# Patient Record
Sex: MALE | Race: WHITE | Employment: FULL TIME | URBAN - METROPOLITAN AREA
[De-identification: names, ages, dates, MRNs, and addresses within clinical notes are randomized per-mention and may not be internally consistent; named-entity substitution may affect disease eponyms.]

---

## 2017-04-01 ENCOUNTER — APPOINTMENT (OUTPATIENT)
Dept: GENERAL RADIOLOGY | Age: 52
End: 2017-04-01
Attending: EMERGENCY MEDICINE
Payer: COMMERCIAL

## 2017-04-01 ENCOUNTER — HOSPITAL ENCOUNTER (EMERGENCY)
Age: 52
Discharge: HOME | End: 2017-04-01
Attending: EMERGENCY MEDICINE
Payer: COMMERCIAL

## 2017-04-01 VITALS
SYSTOLIC BLOOD PRESSURE: 102 MMHG | TEMPERATURE: 98 F | RESPIRATION RATE: 18 BRPM | BODY MASS INDEX: 31.25 KG/M2 | OXYGEN SATURATION: 95 % | HEIGHT: 69 IN | WEIGHT: 211 LBS | HEART RATE: 92 BPM | DIASTOLIC BLOOD PRESSURE: 62 MMHG

## 2017-04-01 DIAGNOSIS — J18.9 PNEUMONIA OF RIGHT MIDDLE LOBE DUE TO INFECTIOUS ORGANISM: Primary | ICD-10-CM

## 2017-04-01 PROCEDURE — 99284 EMERGENCY DEPT VISIT MOD MDM: CPT

## 2017-04-01 PROCEDURE — 36415 COLL VENOUS BLD VENIPUNCTURE: CPT

## 2017-04-01 PROCEDURE — 80047 BASIC METABLC PNL IONIZED CA: CPT

## 2017-04-01 PROCEDURE — 80048 BASIC METABOLIC PNL TOTAL CA: CPT | Performed by: EMERGENCY MEDICINE

## 2017-04-01 PROCEDURE — 71020 XR CHEST PA + LAT CHEST (CPT=71020): CPT

## 2017-04-01 PROCEDURE — 85025 COMPLETE CBC W/AUTO DIFF WBC: CPT | Performed by: EMERGENCY MEDICINE

## 2017-04-01 PROCEDURE — 94640 AIRWAY INHALATION TREATMENT: CPT

## 2017-04-01 PROCEDURE — 94664 DEMO&/EVAL PT USE INHALER: CPT

## 2017-04-01 PROCEDURE — 96374 THER/PROPH/DIAG INJ IV PUSH: CPT

## 2017-04-01 RX ORDER — METHYLPREDNISOLONE SODIUM SUCCINATE 125 MG/2ML
125 INJECTION, POWDER, LYOPHILIZED, FOR SOLUTION INTRAMUSCULAR; INTRAVENOUS ONCE
Status: COMPLETED | OUTPATIENT
Start: 2017-04-01 | End: 2017-04-01

## 2017-04-01 RX ORDER — AZITHROMYCIN 250 MG/1
TABLET, FILM COATED ORAL
Qty: 1 PACKAGE | Refills: 0 | Status: SHIPPED | OUTPATIENT
Start: 2017-04-01 | End: 2017-04-06

## 2017-04-01 RX ORDER — ALBUTEROL SULFATE 90 UG/1
2 AEROSOL, METERED RESPIRATORY (INHALATION) EVERY 4 HOURS PRN
Qty: 1 INHALER | Refills: 0 | Status: SHIPPED | OUTPATIENT
Start: 2017-04-01 | End: 2017-05-01

## 2017-04-01 RX ORDER — IPRATROPIUM BROMIDE AND ALBUTEROL SULFATE 2.5; .5 MG/3ML; MG/3ML
3 SOLUTION RESPIRATORY (INHALATION) ONCE
Status: COMPLETED | OUTPATIENT
Start: 2017-04-01 | End: 2017-04-01

## 2017-04-01 RX ORDER — PREDNISONE 20 MG/1
40 TABLET ORAL DAILY
Qty: 10 TABLET | Refills: 0 | Status: SHIPPED | OUTPATIENT
Start: 2017-04-01 | End: 2017-04-06

## 2017-04-01 RX ORDER — AMOXICILLIN AND CLAVULANATE POTASSIUM 875; 125 MG/1; MG/1
1 TABLET, FILM COATED ORAL 2 TIMES DAILY
Qty: 20 TABLET | Refills: 0 | Status: SHIPPED | OUTPATIENT
Start: 2017-04-01 | End: 2017-04-11

## 2017-04-01 NOTE — ED PROVIDER NOTES
Patient Seen in: THE [facility] Emergency Department In [place]    History   Patient presents with:  Dyspnea AIDEE SOB (respiratory)    Stated Complaint: chest congestion, cough, aidee    HPI    50-year-old male here for evaluation of shortness of breath and a co ago   • Cancer Neg    • Heart Disease Neg    • Stroke Neg          Smoking Status: Former Smoker                   Packs/Day: 0.00  Years:         Alcohol Use: Yes                Comment: Occasional      Review of Systems    Positive for stated complaint: components within normal limits   POCT ISTAT CHEM8 CARTRIDGE - Abnormal; Notable for the following:     ISTAT Chloride 97 (*)     ISTAT Ionized Calcium 1.09 (*)     ISTAT Creatinine 0.6 (*)     ISTAT Hematocrit 36 (*)     ISTAT Glucose 107 (*)     All othe [address]  [phone]    In 3 days        Medications Prescribed:  Discharge Medication List as of 4/1/2017 11:50 AM    START taking these medications    Amoxicillin-Pot Clavulanate 875-125 MG Oral Tab  Take 1 tablet by mouth 2 (two) times daily. , Script julienne

## 2017-08-01 ENCOUNTER — OFFICE VISIT (OUTPATIENT)
Dept: INTERNAL MEDICINE CLINIC | Facility: CLINIC | Age: 52
End: 2017-08-01

## 2017-08-01 VITALS
WEIGHT: 215 LBS | SYSTOLIC BLOOD PRESSURE: 118 MMHG | DIASTOLIC BLOOD PRESSURE: 66 MMHG | BODY MASS INDEX: 30.78 KG/M2 | HEART RATE: 75 BPM | OXYGEN SATURATION: 98 % | TEMPERATURE: 98 F | HEIGHT: 70 IN | RESPIRATION RATE: 17 BRPM
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
## 2017-08-01 DIAGNOSIS — Z12.5 SCREENING FOR PROSTATE CANCER: ICD-10-CM

## 2017-08-01 DIAGNOSIS — Z13.29 SCREENING FOR THYROID DISORDER: ICD-10-CM

## 2017-08-01 DIAGNOSIS — Z77.21 EXPOSURE TO BODY FLUIDS BY CONTAMINATED HYPODERMIC NEEDLE STICK: ICD-10-CM

## 2017-08-01 DIAGNOSIS — E87.1 HYPONATREMIA: ICD-10-CM

## 2017-08-01 DIAGNOSIS — Z13.220 SCREENING FOR LIPID DISORDERS: ICD-10-CM

## 2017-08-01 DIAGNOSIS — Z00.00 ROUTINE PHYSICAL EXAMINATION: Primary | ICD-10-CM

## 2017-08-01 DIAGNOSIS — L40.9 PSORIASIS: ICD-10-CM

## 2017-08-01 DIAGNOSIS — Z13.228 SCREENING FOR METABOLIC DISORDER: ICD-10-CM

## 2017-08-01 DIAGNOSIS — Z12.11 SCREENING FOR MALIGNANT NEOPLASM OF COLON: ICD-10-CM

## 2017-08-01 DIAGNOSIS — D64.9 ANEMIA, UNSPECIFIED TYPE: ICD-10-CM

## 2017-08-01 DIAGNOSIS — C82.90 FOLLICULAR LYMPHOMA, UNSPECIFIED BODY REGION, UNSPECIFIED FOLLICULAR LYMPHOMA TYPE (HCC): ICD-10-CM

## 2017-08-01 PROCEDURE — 99386 PREV VISIT NEW AGE 40-64: CPT | Performed by: NURSE PRACTITIONER

## 2017-08-01 NOTE — PROGRESS NOTES
Vero Dowd is a 49year old male who presents for a complete physical exam.     HPI:   Pt has no complaints. Has history of follicular lymphoma managed by Dr. Danielle Blake. Stated it is \"going great\". Denies current issues with this.      Psoriasis- con • Psoriasis       Past Surgical History:  No date: ANESTH,LOWER ARM SURGERY  No date: OTHER SURGICAL HISTORY      Comment: fistula repair  No date: OTHER SURGICAL HISTORY      Comment: right inguinal lymph node excisional bx                11/21/14   Fam Body mass index is 30.85 kg/m². GENERAL: well developed, well nourished,in no apparent distress  SKIN: Skin warm/dry. Color appropriate for ethnicity. No suspicious lesions.  Noted with many brown colored nevi to trunk (stated follows with derm for this Screening for metabolic disorder- Check CMP  Screening for lipid disorders- Check lipids  Screening for thyroid disorder- check TSH  Screening for prostate cancer- check PSA  Screening for malignant neoplasm of colon- Referred to Dr. Timmy Sutherland.        Order

## 2018-01-19 NOTE — PROGRESS NOTES
Patient is over due for colonoscopy or does not have records in [address] [place] [address] Rd. Received referral to Dr. Vandana Hobbs in Aug 2017. Has he had one, if so we need records. If not please remind him to schedule as soon as possible. Thanks.

## 2018-04-06 ENCOUNTER — OFFICE VISIT (OUTPATIENT)
Dept: INTERNAL MEDICINE CLINIC | Facility: CLINIC | Age: 53
End: 2018-04-06

## 2018-04-06 VITALS
SYSTOLIC BLOOD PRESSURE: 114 MMHG | BODY MASS INDEX: 32.07 KG/M2 | TEMPERATURE: 98 F | WEIGHT: 224 LBS | RESPIRATION RATE: 17 BRPM | HEIGHT: 70 IN | DIASTOLIC BLOOD PRESSURE: 68 MMHG | HEART RATE: 88 BPM

## 2018-04-06 DIAGNOSIS — N52.9 ERECTILE DYSFUNCTION, UNSPECIFIED ERECTILE DYSFUNCTION TYPE: ICD-10-CM

## 2018-04-06 DIAGNOSIS — R05.9 COUGH: ICD-10-CM

## 2018-04-06 DIAGNOSIS — R06.2 WHEEZES: Primary | ICD-10-CM

## 2018-04-06 PROCEDURE — 93000 ELECTROCARDIOGRAM COMPLETE: CPT | Performed by: NURSE PRACTITIONER

## 2018-04-06 PROCEDURE — 99213 OFFICE O/P EST LOW 20 MIN: CPT | Performed by: NURSE PRACTITIONER

## 2018-04-06 RX ORDER — PREDNISONE 20 MG/1
20 TABLET ORAL DAILY
Qty: 5 TABLET | Refills: 0 | Status: SHIPPED | OUTPATIENT
Start: 2018-04-06 | End: 2018-04-16

## 2018-04-06 RX ORDER — ALBUTEROL SULFATE 90 UG/1
2 AEROSOL, METERED RESPIRATORY (INHALATION) EVERY 4 HOURS PRN
Qty: 1 INHALER | Refills: 1 | Status: SHIPPED | OUTPATIENT
Start: 2018-04-06 | End: 2018-12-06

## 2018-04-06 RX ORDER — SILDENAFIL 50 MG/1
50 TABLET, FILM COATED ORAL
Qty: 2 TABLET | Refills: 0 | COMMUNITY
Start: 2018-04-06 | End: 2018-04-16

## 2018-04-06 NOTE — PATIENT INSTRUCTIONS
Inhaler should be primed before first use only by pressing inhaler down once or twice without inhaler your mouth. You do not need to do this each time you use the inhaler. Shake inhaler prior to each use. Place mouth piece loosely in mouth.  DO

## 2018-04-06 NOTE — PROGRESS NOTES
Patient presents with:  Chest Congestion: Room 7 AH- Sticking with pt and not clearing up.  Travels alot on planes and wants to rule out possible pneumonia   Medication Request: Requesting script for Cialis perferred       HPI:  Presents with approx 4 week (VIAGRA) 50 MG Oral Tab Take 1 tablet (50 mg total) by mouth daily as needed for Erectile Dysfunction. Disp: 2 tablet Rfl: 0   Coenzyme Q10 (COQ10 OR) Take 1 tablet by mouth daily.  Disp:  Rfl:    Vitamin C (VITAMIN C) 500 MG Oral Tab Take 500 mg by mouth d daily. Albuterol Sulfate HFA (PROAIR HFA) 108 (90 Base) MCG/ACT Inhalation Aero Soln 1 Inhaler 1      Sig: Inhale 2 puffs into the lungs every 4 (four) hours as needed for Wheezing or Shortness of Breath (coughing).       Sildenafil Citrate (VIAGRA) 50

## 2018-04-14 ENCOUNTER — PATIENT MESSAGE (OUTPATIENT)
Dept: INTERNAL MEDICINE CLINIC | Facility: CLINIC | Age: 53
End: 2018-04-14

## 2018-04-14 DIAGNOSIS — N52.9 ERECTILE DYSFUNCTION, UNSPECIFIED ERECTILE DYSFUNCTION TYPE: Primary | ICD-10-CM

## 2018-04-16 PROBLEM — N52.9 ERECTILE DYSFUNCTION: Status: ACTIVE | Noted: 2018-04-16

## 2018-04-16 RX ORDER — TADALAFIL 5 MG/1
5 TABLET ORAL
Qty: 12 TABLET | Refills: 3 | Status: SHIPPED | OUTPATIENT
Start: 2018-04-16 | End: 2020-01-29 | Stop reason: ALTCHOICE

## 2018-04-16 NOTE — TELEPHONE ENCOUNTER
From: Jovanni Martin  To: Lobito Flanagan NP  Sent: 4/14/2018 3:00 PM CDT  Subject: Prescription Question    Chapo Castro, All good with the Viagra. Can I get a script for Cialis as discussed? I am ok with 10 or more pills depending on price.  Thanks :-)

## 2018-04-17 ENCOUNTER — TELEPHONE (OUTPATIENT)
Dept: INTERNAL MEDICINE CLINIC | Facility: CLINIC | Age: 53
End: 2018-04-17

## 2018-04-17 NOTE — TELEPHONE ENCOUNTER
Received request for prior authorization from [facility], ph: [phone], fax: [phone]    covermalissa: [id number]    Initiate prior auth? Please advise.

## 2018-04-20 NOTE — TELEPHONE ENCOUNTER
Your PA has been faxed to the plan as a paper copy. Please contact the plan directly if you haven't received a determination in a typical timeframe. You will be notified of the determination via fax. Hold for response.

## 2018-04-23 NOTE — TELEPHONE ENCOUNTER
Called and spoke with Grisele @[facility] indicated rx denied for coverage.  Will refax denial letter- received indicating med does not meet the phosphodiesterase (PDE) Type 5 inhibitor agents coverage criteria for medical necessity-  pt does

## 2018-04-23 NOTE — TELEPHONE ENCOUNTER
Called pt to inform received response from insurance indicating med does not meet the phosphodiesterase (PDE) Type 5 inhibitor agents coverage criteria for medical necessity. Per JV, this is not unusual- will need to pay out of pocket.  Pt verbalized unders

## 2018-04-23 NOTE — TELEPHONE ENCOUNTER
Patient is calling to check the status of this. He is hoping to resolve this today because he will be traveling all week. Please advise.

## 2018-04-23 NOTE — TELEPHONE ENCOUNTER
OK. This is not unusual. Please inform patient that we tried but he will need to pay out of pocket. Thanks.

## 2018-12-06 ENCOUNTER — OFFICE VISIT (OUTPATIENT)
Dept: INTERNAL MEDICINE CLINIC | Facility: CLINIC | Age: 53
End: 2018-12-06
Payer: COMMERCIAL

## 2018-12-06 VITALS
OXYGEN SATURATION: 96 % | RESPIRATION RATE: 16 BRPM | BODY MASS INDEX: 36.19 KG/M2 | SYSTOLIC BLOOD PRESSURE: 128 MMHG | HEART RATE: 88 BPM | WEIGHT: 252.81 LBS | TEMPERATURE: 98 F | HEIGHT: 70 IN | DIASTOLIC BLOOD PRESSURE: 80 MMHG

## 2018-12-06 DIAGNOSIS — Z12.11 SCREEN FOR COLON CANCER: ICD-10-CM

## 2018-12-06 DIAGNOSIS — R05.9 COUGH: ICD-10-CM

## 2018-12-06 DIAGNOSIS — J06.9 ACUTE URI: Primary | ICD-10-CM

## 2018-12-06 PROCEDURE — 99213 OFFICE O/P EST LOW 20 MIN: CPT | Performed by: NURSE PRACTITIONER

## 2018-12-06 RX ORDER — AMOXICILLIN AND CLAVULANATE POTASSIUM 875; 125 MG/1; MG/1
1 TABLET, FILM COATED ORAL 2 TIMES DAILY
Qty: 20 TABLET | Refills: 0 | Status: SHIPPED | OUTPATIENT
Start: 2018-12-06 | End: 2018-12-16

## 2018-12-06 RX ORDER — CODEINE PHOSPHATE AND GUAIFENESIN 10; 100 MG/5ML; MG/5ML
10 SOLUTION ORAL NIGHTLY PRN
Qty: 240 ML | Refills: 0 | Status: SHIPPED | OUTPATIENT
Start: 2018-12-06 | End: 2019-06-06 | Stop reason: ALTCHOICE

## 2018-12-06 RX ORDER — ALBUTEROL SULFATE 90 UG/1
2 AEROSOL, METERED RESPIRATORY (INHALATION) EVERY 6 HOURS PRN
Qty: 1 INHALER | Refills: 1 | Status: SHIPPED | OUTPATIENT
Start: 2018-12-06 | End: 2019-06-06 | Stop reason: ALTCHOICE

## 2018-12-06 NOTE — PROGRESS NOTES
Ang Montoya is a 47year old male. Patient presents with:  Cough: cn room 11: cold with cough, cold is gone cough is still ther with chest congestion      HPI:   Presents for eval of cough and chest congestion.   Started with cold symptoms abotu 3-4 weyoana Use      Smoking status: Former Smoker      Smokeless tobacco: Never Used    Alcohol use: Yes      Frequency: Monthly or less      Drinks per session: 1 or 2      Binge frequency: Never      Comment: Occasional    Drug use: No    Family History   Problem R nightly as needed for cough. • Albuterol Sulfate HFA (PROAIR HFA) 108 (90 Base) MCG/ACT Inhalation Aero Soln 1 Inhaler 1     Sig: Inhale 2 puffs into the lungs every 6 (six) hours as needed for Wheezing or Shortness of Breath (coughing).    • Amoxicillin-

## 2019-06-06 ENCOUNTER — OFFICE VISIT (OUTPATIENT)
Dept: INTERNAL MEDICINE CLINIC | Facility: CLINIC | Age: 54
End: 2019-06-06
Payer: COMMERCIAL

## 2019-06-06 VITALS
HEART RATE: 88 BPM | BODY MASS INDEX: 38.8 KG/M2 | DIASTOLIC BLOOD PRESSURE: 84 MMHG | RESPIRATION RATE: 16 BRPM | OXYGEN SATURATION: 96 % | SYSTOLIC BLOOD PRESSURE: 122 MMHG | HEIGHT: 70 IN | WEIGHT: 271 LBS | TEMPERATURE: 98 F

## 2019-06-06 DIAGNOSIS — J01.90 ACUTE RHINOSINUSITIS: Primary | ICD-10-CM

## 2019-06-06 DIAGNOSIS — J20.9 ACUTE BRONCHITIS, UNSPECIFIED ORGANISM: ICD-10-CM

## 2019-06-06 PROCEDURE — 99213 OFFICE O/P EST LOW 20 MIN: CPT | Performed by: INTERNAL MEDICINE

## 2019-06-06 RX ORDER — AMOXICILLIN AND CLAVULANATE POTASSIUM 875; 125 MG/1; MG/1
1 TABLET, FILM COATED ORAL 2 TIMES DAILY
Qty: 20 TABLET | Refills: 0 | Status: SHIPPED | OUTPATIENT
Start: 2019-06-06 | End: 2019-06-16

## 2019-06-06 RX ORDER — BENZONATATE 100 MG/1
100 CAPSULE ORAL 3 TIMES DAILY PRN
Qty: 21 CAPSULE | Refills: 0 | Status: SHIPPED | OUTPATIENT
Start: 2019-06-06 | End: 2019-06-13

## 2019-06-06 RX ORDER — CODEINE PHOSPHATE AND GUAIFENESIN 10; 100 MG/5ML; MG/5ML
5 SOLUTION ORAL NIGHTLY PRN
Qty: 45 ML | Refills: 0 | Status: SHIPPED | OUTPATIENT
Start: 2019-06-06 | End: 2019-08-02 | Stop reason: ALTCHOICE

## 2019-06-06 NOTE — PROGRESS NOTES
Yannick Aranda  9/2/1965    Patient presents with:  Cough: Pt is having cough, wheezing and chest congestion going on for 3 weeks. LB-rm 7      SUBJECTIVE   Yannick Aranda is a 47year old male who presents with nasal and sinus congestion and cough.     The Lymphadenopathy of right cervical region     Psoriasis     Lymphadenopathy, retroperitoneal     Follicular lymphoma (HCC)     Erectile dysfunction     Past Surgical History:   Procedure Laterality Date   • ANESTH,LOWER ARM SURGERY     • EXCISION LYMPH NODE and [employer]  We will consider plain film evaluation of the lungs if symptoms do not improve with the above management    The patient indicates understanding of these issues and agrees to the plan.     TODAY'S ORDERS     No orders of the defined type

## 2019-08-02 ENCOUNTER — OFFICE VISIT (OUTPATIENT)
Dept: INTERNAL MEDICINE CLINIC | Facility: CLINIC | Age: 54
End: 2019-08-02
Payer: COMMERCIAL

## 2019-08-02 VITALS
SYSTOLIC BLOOD PRESSURE: 126 MMHG | WEIGHT: 261 LBS | DIASTOLIC BLOOD PRESSURE: 84 MMHG | RESPIRATION RATE: 16 BRPM | HEIGHT: 70 IN | OXYGEN SATURATION: 98 % | BODY MASS INDEX: 37.37 KG/M2 | HEART RATE: 83 BPM | TEMPERATURE: 98 F

## 2019-08-02 DIAGNOSIS — J01.90 ACUTE RHINOSINUSITIS: Primary | ICD-10-CM

## 2019-08-02 DIAGNOSIS — R09.82 POSTNASAL DRIP: ICD-10-CM

## 2019-08-02 PROCEDURE — 99213 OFFICE O/P EST LOW 20 MIN: CPT | Performed by: INTERNAL MEDICINE

## 2019-08-02 RX ORDER — CODEINE PHOSPHATE AND GUAIFENESIN 10; 100 MG/5ML; MG/5ML
5 SOLUTION ORAL NIGHTLY PRN
Qty: 40 ML | Refills: 0 | Status: SHIPPED | OUTPATIENT
Start: 2019-08-02 | End: 2020-01-29 | Stop reason: ALTCHOICE

## 2019-08-02 RX ORDER — FLUTICASONE PROPIONATE 50 MCG
2 SPRAY, SUSPENSION (ML) NASAL DAILY
Qty: 1 BOTTLE | Refills: 2 | Status: SHIPPED | OUTPATIENT
Start: 2019-08-02 | End: 2020-01-29 | Stop reason: ALTCHOICE

## 2019-08-02 RX ORDER — AMOXICILLIN AND CLAVULANATE POTASSIUM 875; 125 MG/1; MG/1
1 TABLET, FILM COATED ORAL 2 TIMES DAILY
Qty: 20 TABLET | Refills: 0 | Status: SHIPPED | OUTPATIENT
Start: 2019-08-02 | End: 2019-08-12

## 2019-08-02 RX ORDER — BENZONATATE 100 MG/1
100 CAPSULE ORAL 3 TIMES DAILY PRN
Qty: 21 CAPSULE | Refills: 1 | Status: SHIPPED | OUTPATIENT
Start: 2019-08-02 | End: 2019-08-09

## 2019-08-02 NOTE — PROGRESS NOTES
Justin Corral  9/2/1965    Patient presents with:  Cough: x 1 wk, \"travels a lot\", productive, h/o bronchitis      SUBJECTIVE   Justin Corral is a 47year old male who presents with nasal and sinus congestion, and cough.     The patient describes a 7-da Problem List:     Cavovarus deformity of foot, acquired     Obesity     Lymphadenopathy, inguinal     Lymphadenopathy of right cervical region     Psoriasis     Lymphadenopathy, retroperitoneal     Follicular lymphoma (HCC)     Erectile dysfunction     Pas drainage:  Prescribe Augmentin given the severity and duration of symptoms. Tessalon Perles and Cheratussin prescribed ([place]  reviewed).   Advised use of intranasal glucocorticoids and saline  No imaging at this time, however will pursue plain film of the

## 2020-01-29 ENCOUNTER — OFFICE VISIT (OUTPATIENT)
Dept: INTERNAL MEDICINE CLINIC | Facility: CLINIC | Age: 55
End: 2020-01-29
Payer: COMMERCIAL

## 2020-01-29 VITALS
RESPIRATION RATE: 16 BRPM | HEART RATE: 92 BPM | HEIGHT: 70 IN | WEIGHT: 275 LBS | SYSTOLIC BLOOD PRESSURE: 120 MMHG | TEMPERATURE: 98 F | BODY MASS INDEX: 39.37 KG/M2 | DIASTOLIC BLOOD PRESSURE: 76 MMHG

## 2020-01-29 DIAGNOSIS — E66.9 CLASS 2 OBESITY: ICD-10-CM

## 2020-01-29 DIAGNOSIS — N52.9 ERECTILE DYSFUNCTION, UNSPECIFIED ERECTILE DYSFUNCTION TYPE: ICD-10-CM

## 2020-01-29 DIAGNOSIS — H10.45 CHRONIC ALLERGIC CONJUNCTIVITIS: Primary | ICD-10-CM

## 2020-01-29 DIAGNOSIS — Z12.11 SCREEN FOR COLON CANCER: ICD-10-CM

## 2020-01-29 PROCEDURE — 99214 OFFICE O/P EST MOD 30 MIN: CPT | Performed by: INTERNAL MEDICINE

## 2020-01-29 RX ORDER — SILDENAFIL 50 MG/1
50 TABLET, FILM COATED ORAL
Qty: 8 TABLET | Refills: 0 | Status: SHIPPED | OUTPATIENT
Start: 2020-01-29 | End: 2021-09-21

## 2020-01-29 NOTE — PROGRESS NOTES
Li Frank  9/2/1965    Patient presents with:  Eye Problem: bilteral eye x 2-3mon, tearing/dry eye,some redness, discharge, denies itching/pain/irritation  Swelling: notes swelling on upper L eyebrow      SUBJECTIVE   Li Frank is a 49year old m OTHER SURGICAL HISTORY      fistula repair   • OTHER SURGICAL HISTORY      right inguinal lymph node excisional bx 11/21/14      Social History    Tobacco Use      Smoking status: Former Smoker      Smokeless tobacco: Never Used    Alcohol use: Yes      Fr plan.    TODAY'S ORDERS     No orders of the defined types were placed in this encounter.       Meds & Refills:  Requested Prescriptions     Signed Prescriptions Disp Refills   • Sildenafil Citrate (VIAGRA) 50 MG Oral Tab 8 tablet 0     Sig: Take 1 tablet (

## 2020-02-06 ENCOUNTER — PATIENT MESSAGE (OUTPATIENT)
Dept: INTERNAL MEDICINE CLINIC | Facility: CLINIC | Age: 55
End: 2020-02-06

## 2020-02-07 RX ORDER — POLYMYXIN B SULFATE AND TRIMETHOPRIM 1; 10000 MG/ML; [USP'U]/ML
2 SOLUTION OPHTHALMIC EVERY 4 HOURS
Qty: 10 ML | Refills: 0 | Status: SHIPPED | OUTPATIENT
Start: 2020-02-07 | End: 2020-02-14

## 2020-02-07 NOTE — TELEPHONE ENCOUNTER
Per ov note:  Bilateral allergic conjunctivitis:  Chronic  Advised daily oral antihistamine therapy      Please advise if eye drops are an option

## 2020-02-07 NOTE — TELEPHONE ENCOUNTER
From: Oral Boone  To: Siobhan Mancini MD  Sent: 2/6/2020 7:11 PM CST  Subject: Visit Lizabeth Scrivener Dr. Leocadia Mcburney,    I wanted you to know that the antihistamine did not work for my eye condition.  You mentioned giving me a prescription for eye dr

## 2020-02-11 NOTE — TELEPHONE ENCOUNTER
Eye drops were sent on 2/7. This should have been relayed to the patient then. Has he not started his drops?

## 2021-09-15 ENCOUNTER — APPOINTMENT (OUTPATIENT)
Dept: GENERAL RADIOLOGY | Age: 56
End: 2021-09-15
Payer: COMMERCIAL

## 2021-09-15 ENCOUNTER — HOSPITAL ENCOUNTER (EMERGENCY)
Age: 56
Discharge: HOME | End: 2021-09-16
Payer: COMMERCIAL

## 2021-09-15 DIAGNOSIS — U07.1 COVID-19: Primary | ICD-10-CM

## 2021-09-15 PROCEDURE — 99453 REM MNTR PHYSIOL PARAM SETUP: CPT

## 2021-09-15 PROCEDURE — 99284 EMERGENCY DEPT VISIT MOD MDM: CPT

## 2021-09-15 PROCEDURE — 99283 EMERGENCY DEPT VISIT LOW MDM: CPT

## 2021-09-15 PROCEDURE — 71045 X-RAY EXAM CHEST 1 VIEW: CPT

## 2021-09-16 ENCOUNTER — TELEPHONE (OUTPATIENT)
Dept: CASE MANAGEMENT | Age: 56
End: 2021-09-16

## 2021-09-16 ENCOUNTER — TELEPHONE (OUTPATIENT)
Dept: INTERNAL MEDICINE CLINIC | Facility: CLINIC | Age: 56
End: 2021-09-16

## 2021-09-16 VITALS
DIASTOLIC BLOOD PRESSURE: 84 MMHG | RESPIRATION RATE: 20 BRPM | HEART RATE: 77 BPM | BODY MASS INDEX: 39.99 KG/M2 | SYSTOLIC BLOOD PRESSURE: 178 MMHG | WEIGHT: 270 LBS | TEMPERATURE: 97 F | HEIGHT: 69 IN | OXYGEN SATURATION: 96 %

## 2021-09-16 LAB — SARS-COV-2 RNA RESP QL NAA+PROBE: DETECTED

## 2021-09-16 NOTE — ED PROVIDER NOTES
Ultimately change  Patient Seen in: THE [facility] Emergency Department In [place]      History   Patient presents with:  Cough/URI    Stated Complaint: Dhruv, Family Covid +    Subjective:   HPI    43-year-old with a history of bronchitis/pneumonia, psoriasis, f Exam    General: Patient is awake, alert in no acute distress. HEENT: Sclera are not icteric. Conjunctivae within normal limits. Cardiovascular: Regular rate and rhythm, normal S1-S2  Respiratory: Occasional cough is noted no conversational dyspnea.

## 2021-09-16 NOTE — TELEPHONE ENCOUNTER
Pt received PAB infusion at  ED on 9/15/21 for COVID-19. Please follow-up with pt for post-infusion assessment and home monitoring if needed. Thank you.

## 2021-09-16 NOTE — TELEPHONE ENCOUNTER
ESTEFANIA for pt at [phone] (H)vm to call back tomorrow, notified there is always an MD on call, if having severe symptoms then he will need to go to the ER for evaluation.

## 2021-09-16 NOTE — TELEPHONE ENCOUNTER
Message  Received: Today  Fabi Guo MD  [facility] Clinical Staff; Vijay Estes, RN  I spoke with this patient last night. I have not seen him since 2014. He needs to reestablish with us. He needs covid f/u in person in 10 days. I would have advised he get mab last night.        [address] Close Encounter for Cough/URI  9/15/2021      Diagnosis     Covid-19 (Primary)      Orders Signed This Visit  (4)  View All Results   Incentive spriometry: RT to teach pt Once   Pulse oximetry   Rapid SARS-CoV-2 by PCR   XR CHEST AP/PA (1 VIEW) (CPT=71045)      Orders Pended This Visit    None    Progress Notes    None

## 2021-09-16 NOTE — ED QUICK NOTES
PT reports he changed his mind about the antibodies. Would like to have another day to think about it.

## 2021-09-16 NOTE — TELEPHONE ENCOUNTER
PSR:  Please call pt to establish as a NP with our office and COVID f/u appt in person in 10 days, see AS note. Thank you. Patient did have MAB infusion at [facility] [place].  See other TE for Home Monitoring Program.

## 2021-09-20 ENCOUNTER — TELEPHONE (OUTPATIENT)
Dept: INTERNAL MEDICINE CLINIC | Facility: CLINIC | Age: 56
End: 2021-09-20

## 2021-09-20 NOTE — TELEPHONE ENCOUNTER
CXR on 9/15 was unremarkable for acute abnormality, however symptoms have since changed. May need repeat imaging and/or changes in management. Patient should be scheduled for virtual encounter. Also provide UC warnings.

## 2021-09-20 NOTE — TELEPHONE ENCOUNTER
Patient scheduled with [facility] for VV on 9/21/21 at 10:40am.  Pt verbalizes understanding.

## 2021-09-20 NOTE — TELEPHONE ENCOUNTER
Patient states he was COVID + on 9/16/21, has been experiencing a lot of green/brown, sticky phlegm, chest tightness, takes a deep breath or breathing exercises makes him cough right away but helps clear the phlegm, no wheezing or pain, O2 Sat between 92-9

## 2021-09-20 NOTE — TELEPHONE ENCOUNTER
Patient covid + coughing up a lot of green stuff and is having some chest tightness. Patient would like to get chest xray. Please advise.

## 2021-09-21 ENCOUNTER — TELEMEDICINE (OUTPATIENT)
Dept: INTERNAL MEDICINE CLINIC | Facility: CLINIC | Age: 56
End: 2021-09-21

## 2021-09-21 ENCOUNTER — APPOINTMENT (OUTPATIENT)
Dept: GENERAL RADIOLOGY | Facility: HOSPITAL | Age: 56
DRG: 177 | End: 2021-09-21
Attending: EMERGENCY MEDICINE
Payer: COMMERCIAL

## 2021-09-21 ENCOUNTER — HOSPITAL ENCOUNTER (INPATIENT)
Facility: HOSPITAL | Age: 56
LOS: 21 days | Discharge: HOME | DRG: 177 | End: 2021-10-12
Attending: EMERGENCY MEDICINE | Admitting: HOSPITALIST
Payer: COMMERCIAL

## 2021-09-21 DIAGNOSIS — U07.1 PNEUMONIA DUE TO COVID-19 VIRUS: Primary | ICD-10-CM

## 2021-09-21 DIAGNOSIS — J12.82 PNEUMONIA DUE TO COVID-19 VIRUS: Primary | ICD-10-CM

## 2021-09-21 DIAGNOSIS — R09.02 HYPOXIA: ICD-10-CM

## 2021-09-21 DIAGNOSIS — R05.9 COUGH: ICD-10-CM

## 2021-09-21 DIAGNOSIS — U07.1 COVID-19 VIRUS INFECTION: Primary | ICD-10-CM

## 2021-09-21 PROCEDURE — 3E0333Z INTRODUCTION OF ANTI-INFLAMMATORY INTO PERIPHERAL VEIN, PERCUTANEOUS APPROACH: ICD-10-PCS | Performed by: INTERNAL MEDICINE

## 2021-09-21 PROCEDURE — 99213 OFFICE O/P EST LOW 20 MIN: CPT | Performed by: FAMILY MEDICINE

## 2021-09-21 PROCEDURE — 71045 X-RAY EXAM CHEST 1 VIEW: CPT | Performed by: EMERGENCY MEDICINE

## 2021-09-21 PROCEDURE — 99223 1ST HOSP IP/OBS HIGH 75: CPT | Performed by: HOSPITALIST

## 2021-09-21 RX ORDER — BENZONATATE 100 MG/1
100 CAPSULE ORAL 3 TIMES DAILY PRN
Qty: 30 CAPSULE | Refills: 0 | Status: SHIPPED | OUTPATIENT
Start: 2021-09-21 | End: 2021-10-15 | Stop reason: CLARIF

## 2021-09-21 RX ORDER — DEXAMETHASONE SODIUM PHOSPHATE 10 MG/ML
4 INJECTION, SOLUTION INTRAMUSCULAR; INTRAVENOUS ONCE
Status: COMPLETED | OUTPATIENT
Start: 2021-09-21 | End: 2021-09-21

## 2021-09-21 RX ORDER — ALBUTEROL SULFATE 90 UG/1
4 AEROSOL, METERED RESPIRATORY (INHALATION) EVERY 4 HOURS PRN
Status: DISCONTINUED | OUTPATIENT
Start: 2021-09-21 | End: 2021-10-12

## 2021-09-21 RX ORDER — FUROSEMIDE 10 MG/ML
20 INJECTION INTRAMUSCULAR; INTRAVENOUS
Status: COMPLETED | OUTPATIENT
Start: 2021-09-21 | End: 2021-09-23

## 2021-09-21 RX ORDER — ACETAMINOPHEN 325 MG/1
650 TABLET ORAL EVERY 6 HOURS PRN
Status: DISCONTINUED | OUTPATIENT
Start: 2021-09-21 | End: 2021-10-12

## 2021-09-21 RX ORDER — BUDESONIDE 180 UG/1
2 AEROSOL, POWDER RESPIRATORY (INHALATION) 2 TIMES DAILY
COMMUNITY
End: 2021-10-15 | Stop reason: CLARIF

## 2021-09-21 RX ORDER — GUAIFENESIN 600 MG
1200 TABLET, EXTENDED RELEASE 12 HR ORAL 2 TIMES DAILY
Status: DISCONTINUED | OUTPATIENT
Start: 2021-09-21 | End: 2021-10-12

## 2021-09-21 RX ORDER — PROCHLORPERAZINE EDISYLATE 5 MG/ML
5 INJECTION INTRAMUSCULAR; INTRAVENOUS EVERY 8 HOURS PRN
Status: DISCONTINUED | OUTPATIENT
Start: 2021-09-21 | End: 2021-10-12

## 2021-09-21 RX ORDER — ONDANSETRON 2 MG/ML
4 INJECTION INTRAMUSCULAR; INTRAVENOUS EVERY 6 HOURS PRN
Status: DISCONTINUED | OUTPATIENT
Start: 2021-09-21 | End: 2021-10-12

## 2021-09-21 RX ORDER — FLUVOXAMINE MALEATE 50 MG/1
50 TABLET, COATED ORAL 2 TIMES DAILY
Status: ON HOLD | COMMUNITY
Start: 2021-09-18 | End: 2021-10-12

## 2021-09-21 RX ORDER — DEXAMETHASONE SODIUM PHOSPHATE 4 MG/ML
6 VIAL (ML) INJECTION DAILY
Status: DISCONTINUED | OUTPATIENT
Start: 2021-09-22 | End: 2021-09-24

## 2021-09-21 RX ORDER — MELATONIN
3 NIGHTLY PRN
Status: DISCONTINUED | OUTPATIENT
Start: 2021-09-21 | End: 2021-10-12

## 2021-09-21 RX ORDER — ENOXAPARIN SODIUM 100 MG/ML
40 INJECTION SUBCUTANEOUS DAILY
Status: DISCONTINUED | OUTPATIENT
Start: 2021-09-21 | End: 2021-10-12

## 2021-09-21 RX ORDER — DEXAMETHASONE SODIUM PHOSPHATE 4 MG/ML
6 VIAL (ML) INJECTION ONCE
Status: COMPLETED | OUTPATIENT
Start: 2021-09-21 | End: 2021-09-21

## 2021-09-21 RX ORDER — PRAVASTATIN SODIUM 20 MG
20 TABLET ORAL NIGHTLY
COMMUNITY
Start: 2021-09-18 | End: 2021-10-12

## 2021-09-21 NOTE — ED PROVIDER NOTES
Patient Seen in: [facility] Emergency Department      History   Patient presents with:  Covid    Stated Complaint: Covid postive o2 remaining at 90 at home     Subjective:   HPI    72-year-old with a history of bronchitis, follicular lymphoma, psori SpO2 96%   BMI 39.13 kg/m²         Physical Exam    General: Patient is awake, alert, appears fatigued but otherwise in no acute distress. HEENT: Sclera are not icteric. Conjunctivae within normal limits.     Cardiovascular: Regular rate and rhythm  Resp EKG    Rate, intervals and axes as noted on EKG Report. Rate: 94  Rhythm: Sinus Rhythm  Reading: Right bundle branch block. No ischemic changes or dysrhythmia.          Chest x-ray: Patchy airspace opacities within the lungs suspicious for areas of pn

## 2021-09-21 NOTE — H&P
[facility] HOSPITALIST  History and Physical     Deonte Vo Patient Status:  Inpatient    1965 MRN [id number]   [facility] 5NW-A Attending Tiffanie Antonio MD   Hosp Day # 0 PCP Angeles Rehman MD     Chief Complaint: Dyspnea and cough For 14 days, Disp: , Rfl:   fluvoxaMINE 50 MG Oral Tab, Take 50 mg by mouth 2 (two) times daily. FOR 14 DAYS THEN STOP IF BACK TO NORMAL.  AVOID CAFFEINE., Disp: , Rfl:   Budesonide (PULMICORT FLEXHALER) 180 MCG/ACT Inhalation Aerosol Powder, Breath Activat input(s): PTP, INR in the last 168 hours. Recent Labs   Lab 09/21/21  1435   TROP <0.045       Imaging: Imaging data reviewed in Epic.       ASSESSMENT / PLAN:     • Hypoxia  o Continue oxygen, wean as able  o Diurese as tolerated, maintain negative flui

## 2021-09-21 NOTE — PROGRESS NOTES
NURSING ADMISSION NOTE      Patient admitted via Cart  Oriented to room. Safety precautions initiated. Bed in low position. Call light in reach. Patient admitted in stable condition. Admission navigator completed with patient. Pt is AOx4.  On 2L

## 2021-09-21 NOTE — CONSULTS
INFECTIOUS DISEASE CONSULT NOTE    Yannick Aranda Patient Status:  Inpatient    1965 MRN [id number]   [facility] 5NW-A Attending Rosmery Capps MD   Hosp Day # 0 PCP Gerber Selby MD       Reason for Consultation:  Covid 19 infectio mg, Oral, BID  •  Albuterol Sulfate  (90 Base) MCG/ACT inhaler 4 puff, 4 puff, Inhalation, Q4H PRN  •  acetaminophen (TYLENOL) tab 650 mg, 650 mg, Oral, Q6H PRN  •  melatonin tab 3 mg, 3 mg, Oral, Nightly PRN  •  ondansetron (ZOFRAN) injection 4 mg, rhythm. No murmurs. Abdomen: Soft, nontender, nondistended. Positive bowel sounds. Musculoskeletal: Full range of motion of all extremities. No arthritis or deformity  Integument: No rash. No open wounds.       Laboratory Data:    Recent Labs   Lab 09/ tortuous aorta. Clear lungs. Degenerative changes of spine. CONCLUSION:  No evidence of active cardiopulmonary disease.     Dictated by (CST): Rashaun Redmond MD on 9/15/2021 at 11:23 PM     Finalized by (CST): Rashaun Redmond MD on 9/15/2021 at 11 allowing me to participate in the care of this patient. Please do not hesitate to call if you have any questions. I will continue to follow with you and will make further recommendations based on his progress.     Betsy Zhu MD  9/21/2021  6:04 PM

## 2021-09-21 NOTE — ED QUICK NOTES
Orders for admission, patient is aware of plan and ready to go upstairs. Any questions, please call ED KINDRA medrano  at extension [phone].      Vaccinated? unknown  Type of COVID test sent:  COVID Suspicion level: known positive      Titratable drug(s) infusing:

## 2021-09-21 NOTE — ED INITIAL ASSESSMENT (HPI)
Pt covid+ on 9/15. Pt states low O2 at home, 90% on RA. Pt c/o SOB and cough, denies fever. Pt is 89% on RA, placed on 2L in triage with improvement.

## 2021-09-21 NOTE — PROGRESS NOTES
Due to COVID-19 ACTION PLAN, the patient's office visit was converted to a video visit with informed patient consent.    Time Spent: 18 min    Subjective     HPI:   Bozena Avila is a 55year old male who presents for evaluation of his recent COVID infecti relationship, due to the on-going public health crisis/national emergency and because of restrictions of visitation. There are limitations of this visit as no physical exam could be performed.   Every conscious effort was taken to allow for sufficient and

## 2021-09-22 PROCEDURE — 99232 SBSQ HOSP IP/OBS MODERATE 35: CPT | Performed by: INTERNAL MEDICINE

## 2021-09-22 NOTE — DIETARY NOTE
[facility]     Admitting diagnosis:  Hypoxia [R09.02]  Pneumonia due to COVID-19 virus [U07.1, J12.82]    Ht: 175.3 cm (5' 9\")  Wt: 114.6 kg (252 lb 11.2 oz). Body mass index is 37.32 kg/m².   IBW: 72.7kg  Wt R

## 2021-09-22 NOTE — PLAN OF CARE
Problem: Patient/Family Goals  Goal: Patient/Family Long Term Goal  Description: Patient's Long Term Goal:   Discharge home with proper resources    Interventions:  -Follow plan of care  - See additional Care Plan goals for specific interventions  Outcom

## 2021-09-22 NOTE — COVID NURSING ASSESSMENT
COVID-19 Daily Discharge Readiness-Nursing    O2 Sat at Rest:   93  %  2L   O2 Sat with Exertion:    % on    liters   Temperature max from last 24 hrs: Temp (24hrs), Av.1 °F (36.7 °C), Min:98 °F (36.7 °C), Max:98.4 °F (36.9 °C)    Inflammatory Markers:

## 2021-09-22 NOTE — COVID NURSING ASSESSMENT
COVID-19 Daily Discharge Readiness-Nursing    O2 Sat at Rest:   92% 2L   Temperature max from last 24 hrs: Temp (24hrs), Av.3 °F (36.8 °C), Min:98 °F (36.7 °C), Max:98.7 °F (37.1 °C)    Inflammatory Markers: Recent Labs   Lab 21  1435 21  0

## 2021-09-22 NOTE — PAYOR COMM NOTE
--------------  ADMISSION REVIEW     Payor: [address] [facility]  Subscriber #:  [id number]  Authorization Number: [id number]    Admit date: 9/21/21  Admit time:  5:21 PM           Patient Seen in: [facility] Emergency Department      History   Db Rosa Narrative:     Resulted by: batch: K, CRP, FERR, LDH, CO2, CL, NA, ALB, TBIL, ALT, AST, ALP, CA, CREA, BUN, GLUC,    CBC WITH DIFFERENTIAL WITH PLATELET    Narrative:      The following orders were created for panel order CBC With Differential With Platelet (Physician)           [facility] HOSPITALIST  History and Physical     Justin Shayna Patient Status:  Inpatient    1965 MRN [id number]   [facility] 5NW-A Attending Sandra Colorado MD   Hosp Day # 0 PCP Nick Tanner MD     Chief Complain Phosphate (DECADRON) 4 MG/ML injection 6 mg     Date Action Dose Route User    9/21/2021 1432 Given  Intravenous Jimmy Sutton RN      enoxaparin (LOVENOX) 40 MG/0.4ML injection 40 mg     Date Action Dose Route User    9/21/2021 1813 Given  Subcutaneou

## 2021-09-23 PROCEDURE — 99232 SBSQ HOSP IP/OBS MODERATE 35: CPT | Performed by: INTERNAL MEDICINE

## 2021-09-23 RX ORDER — BENZONATATE 100 MG/1
100 CAPSULE ORAL 3 TIMES DAILY PRN
Status: DISCONTINUED | OUTPATIENT
Start: 2021-09-23 | End: 2021-10-12

## 2021-09-23 RX ORDER — PANTOPRAZOLE SODIUM 40 MG/1
40 TABLET, DELAYED RELEASE ORAL
Status: DISCONTINUED | OUTPATIENT
Start: 2021-09-23 | End: 2021-10-12 | Stop reason: ALTCHOICE

## 2021-09-23 NOTE — COVID NURSING ASSESSMENT
COVID-19 Daily Discharge Readiness-Nursing    O2 Sat at Rest:   93  % 2L  O2 Sat with Exertion:    % on    liters   Temperature max from last 24 hrs: Temp (24hrs), Av.3 °F (36.8 °C), Min:98.2 °F (36.8 °C), Max:98.4 °F (36.9 °C)    Inflammatory Markers:

## 2021-09-23 NOTE — PROGRESS NOTES
[facility]     Hospitalist Progress Note     Yasir Quiles Patient Status:  Inpatient    1965 MRN [id number]   [facility] 5NW-A Attending Erick Acuña MD   [address] Day # 2 PCP Rachel Dutta MD     Chief Complaint: SOB, cough 782*       Inflammatory Markers  Recent Labs   Lab 09/21/21  1435 09/22/21  0736 09/23/21  0642   CRP 12.90* 11.80* 5.80*   MARYSOL 596.1* 661.1* 703.2*   * 275* 263*   DDIMER 0.46 0.36 0.29       Imaging: Imaging data reviewed in Epic.     Medications:

## 2021-09-23 NOTE — COVID NURSING ASSESSMENT
COVID-19 Daily Discharge Readiness-Nursing    O2 Sat at Rest:    91 % on 3L  O2 Sat with Exertion: SPO2% Ambulation on Oxygen: 90  % on Ambulation oxygen flow (liters per minute): 4  liters   Temperature max from last 24 hrs: Temp (24hrs), Av.5 °F (36.

## 2021-09-23 NOTE — PROGRESS NOTES
INFECTIOUS DISEASE PROGRESS NOTE    Justin Corral Patient Status:  Inpatient    1965 MRN [id number]   [facility] 5NW-A Attending Sandra Colorado MD   Hosp Day # 2 PCP Nick Tanner MD     Remdesivir no  Dexa D#3    Subjective: Pt no 09/21/21  1435 09/22/21  0736 09/23/21  0642   RBC 4.88 4.99 5.10   HGB 13.9 14.6 14.8   HCT 42.1 43.0 45.4   MCV 86.3 86.2 89.0   MCH 28.5 29.3 29.0   MCHC 33.0 34.0 32.6   RDW 12.3 12.5 12.4   NEPRELIM 5.66 4.36 9.71*   WBC 7.7 6.1 12.6*   .0 193. runny nose, weakness and chest congestion. History of bronchitis and pneumonia. FINDINGS:  Normal heart size and pulmonary vascularity. Mildly tortuous aorta. Clear lungs. Degenerative changes of spine.             CONCLUSION:  No evidence of active c symptomatic treatment of cough    2. acute hypoxic resp failure due to above  - wean O2 as able  - prone as tolerated, should also encourage ambulation and IS as able    3. Leukocytosis- mild.  Assume steroid related    Case and plan d/w pt over the phone

## 2021-09-23 NOTE — PROGRESS NOTES
09/23/21 1110   Mobility   O2 walk?  Yes   SPO2% on Oxygen at Rest 90   At rest oxygen flow (liters per minute) 3   SPO2% Ambulation on Oxygen 90   Ambulation oxygen flow (liters per minute) 4

## 2021-09-24 ENCOUNTER — APPOINTMENT (OUTPATIENT)
Dept: CT IMAGING | Facility: HOSPITAL | Age: 56
DRG: 177 | End: 2021-09-24
Attending: INTERNAL MEDICINE
Payer: COMMERCIAL

## 2021-09-24 PROCEDURE — 71275 CT ANGIOGRAPHY CHEST: CPT | Performed by: INTERNAL MEDICINE

## 2021-09-24 PROCEDURE — XW0DXM6 INTRODUCTION OF BARICITINIB INTO MOUTH AND PHARYNX, EXTERNAL APPROACH, NEW TECHNOLOGY GROUP 6: ICD-10-PCS | Performed by: INTERNAL MEDICINE

## 2021-09-24 PROCEDURE — 99232 SBSQ HOSP IP/OBS MODERATE 35: CPT | Performed by: INTERNAL MEDICINE

## 2021-09-24 RX ORDER — FUROSEMIDE 10 MG/ML
20 INJECTION INTRAMUSCULAR; INTRAVENOUS ONCE
Status: COMPLETED | OUTPATIENT
Start: 2021-09-24 | End: 2021-09-24

## 2021-09-24 RX ORDER — DEXAMETHASONE SODIUM PHOSPHATE 4 MG/ML
6 VIAL (ML) INJECTION EVERY 12 HOURS SCHEDULED
Status: DISPENSED | OUTPATIENT
Start: 2021-09-24 | End: 2021-10-01

## 2021-09-24 NOTE — CONSULTS
Jose Luis Das [address] [facility]  Pulmonary/Critical Care Consult Note  [facility]  Report of Consultation    Justin Corral Patient Status:  Inpatient    1965 MRN [id number]   [facility] 5NW-A Attending Yg Hughes pantoprazole  40 mg Oral QAM AC   • guaiFENesin ER  1,200 mg Oral BID   • enoxaparin  40 mg Subcutaneous Daily   • dexamethasone  6 mg Oral Daily    Or   • dexamethasone Sodium Phosphate  6 mg Intravenous Daily     benzonatate, sodium chloride 0.9%, [place] 131/66 99.9 °F (37.7 °C) Oral — 18 —       Intake/Output:    Intake/Output Summary (Last 24 hours) at 9/24/2021 1115  Last data filed at 9/24/2021 0900  Gross per 24 hour   Intake 1640 ml   Output —   Net 1640 ml       PHYSICAL EXAM  GEN: Appears alert, or Component Value Date    PCT 0.08 09/23/2021       Inflammatory Markers  Recent Labs   Lab 09/21/21  1435 09/21/21  1435 09/22/21  0736 09/23/21  0642 09/24/21  0633   CRP 12.90*   < > 11.80* 5.80* 17.90*   MARYSOL 596.1*   < > 661.1* 703.2* 809.0*   * baricitinib if patient agreeable  Follow inflammatory markers and d dimer closely; 9/24 CTA with no PE; check BLE US for possible DVT  Encouraged to self prone as able, goal 16 hours/day  Follow fluid balance, lytes, urine output; caution with IVF in COVID

## 2021-09-24 NOTE — PROGRESS NOTES
INFECTIOUS DISEASE PROGRESS NOTE    Yasir Mess Patient Status:  Inpatient    1965 MRN [id number]   [facility] 5NW-A Attending Leighton Weinstein MD   Hosp Day # 3 PCP Rachel Dutta MD     Remdesivir no  Dexa D#3    Subjective: Pt no exposure.       Laboratory Data:    Recent Labs   Lab 09/22/21  0736 09/23/21  0642 09/24/21  0633   RBC 4.99 5.10 4.99   HGB 14.6 14.8 14.3   HCT 43.0 45.4 43.8   MCV 86.2 89.0 87.8   MCH 29.3 29.0 28.7   MCHC 34.0 32.6 32.6   RDW 12.5 12.4 12.7   NEPRELIM consolidation in R base, concerning for bacterial pna- add CTX  - cont dexa in view of hypoxia  - refused RDV  - agree with alicia, started today in view of increased hypoxia\  - monitor O2 needs, wean O2 as able  - prone as able, otherwise encourage ambulat

## 2021-09-24 NOTE — PROGRESS NOTES
[facility]     Hospitalist Progress Note     Sol Cumberland Patient Status:  Inpatient    1965 MRN [id number]   [facility] 5NW-A Attending Cyntha Leventhal, MD   [facility] Day # 3 PCP Ned Duarte MD     Chief Complaint: SOB, cough Lab 09/21/21  1435   *       Inflammatory Markers  Recent Labs   Lab 09/21/21  1435 09/21/21  1435 09/22/21  0736 09/23/21  0642 09/24/21  0633   CRP 12.90*  --  11.80* 5.80*  --    MARYSOL 596.1*  --  661.1* 703.2*  --    *  --  275* 263*  --

## 2021-09-24 NOTE — PAYOR COMM NOTE
--------------  CONTINUED STAY REVIEW    Payor: [facility]  Subscriber #:  [id number]  Authorization Number: [id number]    Admit date: 9/21/21  Admit time:  5:21 PM    Admitting Physician: José Nathan MD  Attending Physician:  Patricia Cantor PRN  •  guaiFENesin ER (MUCINEX) 12 hr tab 1,200 mg, 1,200 mg, Oral, BID  •  Albuterol Sulfate  (90 Base) MCG/ACT inhaler 4 puff, 4 puff, Inhalation, Q4H PRN  •  acetaminophen (TYLENOL) tab 650 mg, 650 mg, Oral, Q6H PRN  •  melatonin tab 3 mg, 3 mg, No results found for: VANCT  No results for input(s): Braeden Draper, [address], [address], [address], [address], [address] [facility], [address] [facility], [address], [facility] [address], [address] [place], [address] [facility], [address], [facility] 799, RBCUR, Helayne Syl in the last 168 hours.      COVID-19 Lab Results     COVID-19 CHEST (CPT=71020), 4/01/2017, 10:06 AM.  INDICATIONS:  Covid postive o2 remaining at 90 at home  PATIENT STATED HISTORY: (As transcribed by Technologist)     FINDINGS:  There are some patchy airspace opacities within the lungs suspicious for areas of pneum

## 2021-09-24 NOTE — PAYOR COMM NOTE
--------------  CONTINUED STAY REVIEW    Payor: [facility]  Subscriber #:  [id number]  Authorization Number: [id number]    Admit date: 9/21/21  Admit time:  5:21 PM    Admitting Physician: José Nathan MD  Attending Physician:  Patricia Cantor point ROS completed and was negative, except for pertinent positive and negatives stated in subjective.     Vital signs:  Temp:  [98.7 °F (37.1 °C)-99.9 °F (37.7 °C)] 99.1 °F (37.3 °C)  Pulse:  [] 95  Resp:  [18-20] 18  BP: ()/(50-86) 95/50          Imaging: Imaging data reviewed in Epic.     Medications:   • pantoprazole  40 mg Oral QAM AC   • guaiFENesin ER  1,200 mg Oral BID   • enoxaparin  40 mg Subcutaneous Daily   • dexamethasone  6 mg Oral Daily     Or   • dexamethasone Sodium Phosphate

## 2021-09-24 NOTE — COVID NURSING ASSESSMENT
COVID-19 Daily Discharge Readiness-Nursing    O2 Sat at Rest:     89% on 10L  O2 Sat with Exertion: SPO2% Ambulation on Oxygen: 90  % on 15  liters   Temperature max from last 24 hrs: Temp (24hrs), Av.2 °F (37.3 °C), Min:98.7 °F (37.1 °C), Max:99.9 °F

## 2021-09-24 NOTE — PLAN OF CARE
COVID-19 Daily Discharge Readiness-Nursing    O2 Sat at Rest:     %   O2 Sat with Exertion: SPO2% Ambulation on Oxygen: 90  % on Ambulation oxygen flow (liters per minute): 4  liters   Temperature max from last 24 hrs: Temp (24hrs), Av.3 °F (37.4 °C), cough  9/22(NOC): Able to wean O2, relieve cough, sleep well  9/23: Get some rest. Wean O2  9/23 noc: wean O2  Interventions:   - IS  -Decadron  -Cluster care  -Dim lighting  -robitussin  - See additional Care Plan goals for specific interventions  Outcome

## 2021-09-25 ENCOUNTER — APPOINTMENT (OUTPATIENT)
Dept: ULTRASOUND IMAGING | Facility: HOSPITAL | Age: 56
DRG: 177 | End: 2021-09-25
Attending: INTERNAL MEDICINE
Payer: COMMERCIAL

## 2021-09-25 PROCEDURE — 5A0945Z ASSISTANCE WITH RESPIRATORY VENTILATION, 24-96 CONSECUTIVE HOURS: ICD-10-PCS | Performed by: INTERNAL MEDICINE

## 2021-09-25 PROCEDURE — 93970 EXTREMITY STUDY: CPT | Performed by: INTERNAL MEDICINE

## 2021-09-25 PROCEDURE — 99232 SBSQ HOSP IP/OBS MODERATE 35: CPT | Performed by: INTERNAL MEDICINE

## 2021-09-25 RX ORDER — FUROSEMIDE 10 MG/ML
20 INJECTION INTRAMUSCULAR; INTRAVENOUS ONCE
Status: COMPLETED | OUTPATIENT
Start: 2021-09-25 | End: 2021-09-25

## 2021-09-25 NOTE — PROGRESS NOTES
Assumed care for pt at 1300. Pt is aox4, VSS, afebrile. Diaphoretic, pt states this \"happens when he takes medications\". IV dressing changed. On vapotherm 40L 100% FiO2, prones majority of the day.  Pt sat at edge of bed for lunch, dropped to 86% briefly,

## 2021-09-25 NOTE — PROGRESS NOTES
[facility]  Progress Note    Pool Izquierdo Patient Status:  Inpatient    1965 MRN [id number]   [facility] 5NW-A Attending Stacia Hagan MD   Hosp Day # 4 PCP Mendez Bravo MD     Subjective:  Pool Izquierdo is a(n) 55 year o input(s): Mel Cadet, [address] [place] [address], [address] [place] [address]    COVID-19 Lab Results    COVID-19  Lab Results   Component Value Date    COVID19 Detected (A) 09/16/2021       Pro-Calcitonin  Recent Labs   Lab 09/21/21  1435 09/23/21  0642 09/24/21  0633   PCT 0.06 0. dimer  · Obesity  · H/o psoriasis not on treatment  · Hx lymphoma, 2015    Plan:  · Continue close monitoring of respiratory status, wean o2 for sats >89%  · PCT negative, continue to hold off abx  · Continue dexamethasone day 5 of 10- increased to BID dos

## 2021-09-25 NOTE — PLAN OF CARE
Problem: Patient/Family Goals  Goal: Patient/Family Long Term Goal  Description: Patient's Long Term Goal:   Discharge home with proper resources    Interventions:  -Follow plan of care  - See additional Care Plan goals for specific interventions  Outcom appropriate  Outcome: Progressing

## 2021-09-25 NOTE — PROGRESS NOTES
09/25/21 0614   Oxygen Therapy   SpO2 90 %   O2 Device High flow nasal cannula  (and non-rebreather mask)   O2 Flow Rate (L/min) 15 L/min   Pulse Oximetry Type Continuous     Pt got up to use urinal at bedside, desat to low 80s on HFNC at 15mL.  Non-rebr

## 2021-09-25 NOTE — PLAN OF CARE
COVID-19 Daily Discharge Readiness-Nursing    O2 Sat at Rest:     %   O2 Sat with Exertion: SPO2% Ambulation on Oxygen: 90  % on Ambulation oxygen flow (liters per minute): 4  liters   Temperature max from last 24 hrs: Temp (24hrs), Av °F (37.2 °C), Mi Short Term Goal:   9/21 ([address]): Able to wean O2, sleep well  9/22 AM: wean O2, manage cough  9/22(NOC): Able to wean O2, relieve cough, sleep well  9/23: Get some rest. Wean O2  9/23 noc: wean O2  9/24 noc: wean O2, avoid vapo  Interventions:   - IS, prone

## 2021-09-25 NOTE — PROGRESS NOTES
[facility]     Hospitalist Progress Note     Colt Venegas Patient Status:  Inpatient    1965 MRN [id number]   [facility] 5NW-A Attending Ashok Jimenez MD   Hosp Day # 4 PCP Thalia Holt MD     Chief Complaint: SOB, cough Lab 09/21/21  1435   TROP <0.045   PBNP 42       Creatinine Kinase  Recent Labs   Lab 09/21/21  1435   *       Inflammatory Markers  Recent Labs   Lab 09/22/21  0736 09/23/21  0642 09/24/21  0633   CRP 11.80* 5.80* 17.90*   MARYSOL 661.1* 703.2* 809.0*

## 2021-09-25 NOTE — COVID NURSING ASSESSMENT
COVID-19 Daily Discharge Readiness-Nursing    O2 Sat at Rest:     95% on 40L Vapotherm prone  O2 Sat with Exertion:NA  Temperature max from last 24 hrs: Temp (24hrs), Av.6 °F (37 °C), Min:98.1 °F (36.7 °C), Max:99 °F (37.2 °C)    Inflammatory Markers:

## 2021-09-25 NOTE — PROGRESS NOTES
INFECTIOUS DISEASE PROGRESS NOTE    Isa Alvarez Patient Status:  Inpatient    1965 MRN [id number]   [facility] 5NW-A Attending Ramone Cedillo MD   Hosp Day # 4 PCP Zoey Bullard MD     Remdesivir no  Dexa D#4  alicia d#2  CXT d#2 oriented. No distress. proning  HEENT: no scleral icterus or conjunctival injection. Moist mucous membranes. No thrush  Neck: No lymphadenopathy. Supple. Respiratory: Clear to auscultation bilaterally. No wheezes. No rhonchi.   Cardiovascular: S1, S2.  R Labs   Lab 09/23/21  0365 09/24/21  0633 09/25/21  0716 09/25/21  1026   CRP 5.80* 17.90* 23.70*  --    MARYSOL 703.2* 809.0* 1,038.2*  --    * 343* 272*  --    DDIMER 0.29 0.69*  --  0.65*       Microbiology    Reviewed in EMR,   No results found for t

## 2021-09-26 ENCOUNTER — APPOINTMENT (OUTPATIENT)
Dept: GENERAL RADIOLOGY | Facility: HOSPITAL | Age: 56
DRG: 177 | End: 2021-09-26
Attending: INTERNAL MEDICINE
Payer: COMMERCIAL

## 2021-09-26 PROCEDURE — 5A09457 ASSISTANCE WITH RESPIRATORY VENTILATION, 24-96 CONSECUTIVE HOURS, CONTINUOUS POSITIVE AIRWAY PRESSURE: ICD-10-PCS | Performed by: INTERNAL MEDICINE

## 2021-09-26 PROCEDURE — 99233 SBSQ HOSP IP/OBS HIGH 50: CPT | Performed by: INTERNAL MEDICINE

## 2021-09-26 PROCEDURE — 71045 X-RAY EXAM CHEST 1 VIEW: CPT | Performed by: INTERNAL MEDICINE

## 2021-09-26 NOTE — COVID NURSING ASSESSMENT
COVID-19 Daily Discharge Readiness-Nursing    O2 Sat at Rest:     %   O2 Sat with Exertion: SPO2% Ambulation on Oxygen: 90  % on Ambulation oxygen flow (liters per minute): 4  liters   Temperature max from last 24 hrs: Temp (24hrs), Av.3 °F (36.8 °C),

## 2021-09-26 NOTE — PROGRESS NOTES
[facility]     Hospitalist Progress Note     Oralbri Boone Patient Status:  Inpatient    1965 MRN [id number]   [facility] 5NW-A Attending Antonette Sadler MD   [address] Day # 5 PCP Phillip Valentine MD     Chief Complaint: SOB, cough Labs   Lab 09/21/21  1435   TROP <0.045   PBNP 42       Creatinine Kinase  Recent Labs   Lab 09/21/21  1435   *       Inflammatory Markers  Recent Labs   Lab 09/24/21  0633 09/25/21  0716 09/25/21  1026 09/26/21  0714   CRP 17.90* 23.70*  --  8.39*

## 2021-09-26 NOTE — PROGRESS NOTES
[facility]  Progress Note    Earnstine Chyle Patient Status:  Inpatient    1965 MRN [id number]   [facility] 5NW-A Attending Praful Mcclelland MD   Hosp Day # 5 PCP Graham Redd MD     Subjective:  Earmelina Chyle is a(n) 55 year o 1.9*   * 136 139   K 4.1 4.0 4.0   CL 98 101 103   CO2 26.0 31.0 31.0   ALKPHO 62 62 61   AST 55* 42* 31   ALT 51 53 56   BILT 0.7 0.4 0.3   TP 6.8 6.5 6.4       No results for input(s): MG in the last 168 hours.     No results found for: PHOS, PHOSPH mg, Oral, Nightly PRN  ondansetron (ZOFRAN) injection 4 mg, 4 mg, Intravenous, Q6H PRN  Prochlorperazine Edisylate (COMPAZINE) injection 5 mg, 5 mg, Intravenous, Q8H PRN  enoxaparin (LOVENOX) 40 MG/0.4ML injection 40 mg, 40 mg, Subcutaneous, Daily  guaiFEN

## 2021-09-26 NOTE — PROGRESS NOTES
Patient arrived from floor at [address] [place] [address]. Transferred into ICU bed, applied all monitors. O2 sats continue to be low. On Vapo 40L/100%. Encouraged coughing and deep breathing.

## 2021-09-26 NOTE — PROGRESS NOTES
Assisted pt to bedside to use urinal, sats dropped to 79%. After returning to bed, sats still in low to mid 80s, taking a long time to reach 90%. Nonrebreather placed over vapotherm at this time to maintain sats WNL.  RT and pulm notified, with new orders t

## 2021-09-27 PROCEDURE — 99233 SBSQ HOSP IP/OBS HIGH 50: CPT | Performed by: INTERNAL MEDICINE

## 2021-09-27 RX ORDER — SENNOSIDES 8.6 MG
8.6 TABLET ORAL 2 TIMES DAILY
Status: DISCONTINUED | OUTPATIENT
Start: 2021-09-27 | End: 2021-10-12

## 2021-09-27 RX ORDER — DOCUSATE SODIUM 100 MG/1
100 CAPSULE, LIQUID FILLED ORAL 2 TIMES DAILY
Status: DISCONTINUED | OUTPATIENT
Start: 2021-09-27 | End: 2021-10-12

## 2021-09-27 RX ORDER — POLYETHYLENE GLYCOL 3350 17 G/17G
17 POWDER, FOR SOLUTION ORAL DAILY PRN
Status: DISCONTINUED | OUTPATIENT
Start: 2021-09-27 | End: 2021-10-12

## 2021-09-27 NOTE — PAYOR COMM NOTE
--------------  CONTINUED STAY REVIEW-----REQUESTING ADDITIONAL DAYS 9/25, 9/26 AND 9/27      Payor: [address] [facility]  Subscriber #:  [id number]  Authorization Number: [id number]    Admit date: 9/21/21  Admit time:  5:21 PM    Admitting Physician: Paul Garcia Value Date     COVID19 Detected (A) 09/16/2021         Pro-Calcitonin        Recent Labs   Lab 09/21/21  1435 09/23/21  0642 09/24/21  0633   PCT 0.06 0.08 0.14         Cardiac      Recent Labs   Lab 09/21/21  1435   TROP <0.045   PBNP 42         [employer]:   10 point ROS completed and was negative, except for pertinent positive and negatives stated in subjective.     Vital signs:  Temp:  [97.2 °F (36.2 °C)-98.7 °F (37.1 °C)] 98 °F (36.7 °C)  Pulse:  [66-80] 70  Resp:  [16-29] 23  BP: (118-142)/(66-7 0.65* 0.46         Imaging: Imaging data reviewed in Epic.     Medications:   • dexamethasone  6 mg Oral 2 times per day     Or   • dexamethasone Sodium Phosphate  6 mg Intravenous 2 times per day   • Baricitinib  4 mg Oral Daily   • cefTRIAXone  2 g Intra chloride 0.9% 100 mL IVPB-ADDV, 2 g, Intravenous, Q24H  •  pantoprazole (PROTONIX) EC tab 40 mg, 40 mg, Oral, QAM AC  •  benzonatate (TESSALON) cap 100 mg, 100 mg, Oral, TID PRN  •  sodium chloride 0.9% IV bolus 500 mL, 500 mL, Intravenous, PRN  •  guaiFEN 09/27/2021 4.65 (H)      No results found for: VANCT  No results for input(s): Dax Burnham, SPECGRAVITY, [address], [address], [address], [address] [facility], [address] [place] [address], [facility] [address], [facility] [address], [address] [place], NITRITE, LEUUR, WBCUR, RBCUR, José Miguel Spitz in the last 168 hours.      COVID-19 L unvaccinated, symptoms for 9 days on admission.  Refused Regencov   - doubt superimposed bacterial pna on admission but CTA 9/24 with a more dense consolidation in R base, concerning for bacterial pna- added CTX 9/24  - cont dexa in view of hypoxia  - refus RN    9/27/2021 0907 Given  Oral Chandrika Liu RN    9/27/2021 0045 Given  Oral Migel Rodríguez RN    9/26/2021 1848 Given  Oral Chandrika Liu RN      guaiFENesin ER [facility]) 12 hr tab 1,200 mg     Date Action Dose Route User    9/27/2021

## 2021-09-27 NOTE — CM/SW NOTE
sw reviewed chart for dc planning. Pt COVID+ unvaccinated; from home with family; employed.  Pt currently in ICU on bipap. sw to remain available

## 2021-09-27 NOTE — PROGRESS NOTES
09/27/21 1305   BiPAP   $ RT Standby Charge (per 15 min) 1   Device V60   BiPAP / CPAP CE# 6081   Mode Spontaneous/Timed   Interface Full face mask   Mask Size Large   Control Settings   Set Rate 14 breaths/min   Set IPAP 12   Set EPAP 6   Oxygen Percen

## 2021-09-27 NOTE — PROGRESS NOTES
[facility]  Progress Note    Lexy Luke Patient Status:  Inpatient    1965 MRN [id number]   [facility] 5NW-A Attending Yajaira Peralta MD   Hosp Day # 6 PCP Jaquelin Joyner MD     Subjective:  Lexy Luke is a(n) 55 year o 4.0 4.0 4.6    103 100   CO2 31.0 31.0 32.0   ALKPHO 62 61 61   AST 42* 31 27   ALT 53 56 47   BILT 0.4 0.3 0.4   TP 6.5 6.4 6.3*       Recent Labs   Lab 09/27/21  0459   MG 2.4       Lab Results   Component Value Date    PHOS 3.4 09/27/2021        N Inhalation, Q4H PRN  acetaminophen (TYLENOL) tab 650 mg, 650 mg, Oral, Q6H PRN  melatonin tab 3 mg, 3 mg, Oral, Nightly PRN  ondansetron (ZOFRAN) injection 4 mg, 4 mg, Intravenous, Q6H PRN  Prochlorperazine Edisylate (COMPAZINE) injection 5 mg, 5 mg, Intra

## 2021-09-27 NOTE — PROGRESS NOTES
[facility]     Hospitalist Progress Note     [place] New Patient Status:  Inpatient    1965 MRN [id number]   [facility] 5NW-A Attending Vimal Styles MD   [facility] Day # 6 PCP Debbie Estrella MD     Chief Complaint: SOB, cough 09/23/21  0642 09/24/21  0633   PCT 0.06 0.08 0.14       Cardiac  Recent Labs   Lab 09/21/21  1435   TROP <0.045   PBNP 42       Creatinine Kinase  Recent Labs   Lab 09/21/21  1435   *       Inflammatory Markers  Recent Labs   Lab 09/24/21  9351 09/2

## 2021-09-27 NOTE — PLAN OF CARE
Lying supine in bed upon initial assessment. A/Ox3. VSS afebrile. Discussed scheduled pm medicaitons, took without difficulty with sips of water. Agreeable to prone, which he does with some assistance. Currently on BIPAP, tolerating well.  Voids per urinal.

## 2021-09-27 NOTE — PROGRESS NOTES
INFECTIOUS DISEASE PROGRESS NOTE    Luz Poon Patient Status:  Inpatient    1965 MRN [id number]   [facility] 5NW-A Attending Dmitry Castellanos MD   Hosp Day # 6 PCP Benton Mayorga MD     Remdesivir no  Dexa D#6  alicia d#4  CXT d#4 (36.6 °C), resp. rate (!) 32, height 5' 9\" (1.753 m), weight 259 lb 4.2 oz (117.6 kg), SpO2 94 %.       Laboratory Data:    Recent Labs   Lab 09/23/21  0642 09/24/21  0633 09/27/21  0459   RBC 5.10 4.99 5.16   HGB 14.8 14.3 14.5   HCT 45.4 43.8 46.4   MCV XR CHEST AP PORTABLE (CPT=71045)     TECHNIQUE: AP chest radiograph was obtained.      COMPARISON: [facility] , XR, XR CHEST AP PORTABLE (CPT=71045), 9/21/2021, 2:51 PM. [place], XR, XR CHEST AP/PA (1 VIEW) (CPT=71045), 9/15/2021, 10:43 PM.     INDICATIONS:

## 2021-09-27 NOTE — PROGRESS NOTES
09/27/21 0325   BiPAP   $ RT Standby Charge (per 15 min) 1   Device V60   BiPAP / CPAP CE# 6081   Mode Spontaneous/Timed   Interface Full face mask   Mask Size Large   Control Settings   Set Rate 18 breaths/min   Set IPAP 12   Set EPAP 6   Oxygen Percen

## 2021-09-28 PROCEDURE — 99233 SBSQ HOSP IP/OBS HIGH 50: CPT | Performed by: INTERNAL MEDICINE

## 2021-09-28 NOTE — PROGRESS NOTES
[facility]  Progress Note    Michael Foreman Patient Status:  Inpatient    1965 MRN [id number]   [facility] 5NW-A Attending Evan Hi MD   Hosp Day # 7 PCP Rashel Major MD     Subjective:  Michael Foreman is a(n) 55 year o BUN 21* 17 16   CREATSERUM 0.56* 0.45* 0.68*   GFRAA 135 147 124   GFRNAA 116 127 108   CA 9.7 9.4 9.4   ALB 1.9* 2.0* 2.0*    135* 138   K 4.0 4.6 5.3*    100 100   CO2 31.0 32.0 38.0*   ALKPHO 61 61 62   AST 31 27 28   ALT 56 47 50   BILT 0 IVPB-ADDV, 2 g, Intravenous, Q24H  pantoprazole (PROTONIX) EC tab 40 mg, 40 mg, Oral, QAM AC  benzonatate (TESSALON) cap 100 mg, 100 mg, Oral, TID PRN  sodium chloride 0.9% IV bolus 500 mL, 500 mL, Intravenous, PRN  guaiFENesin ER (MUCINEX) 12 hr tab 1,200

## 2021-09-28 NOTE — PROGRESS NOTES
[facility]     Hospitalist Progress Note     [place] New Patient Status:  Inpatient    1965 MRN [id number]   [facility] 5NW-A Attending Vimal Styles MD   [facility] Day # 7 PCP Debbie Estrella MD     Chief Complaint: SOB, cough 09/23/21  0642 09/24/21  0633   PCT 0.06 0.08 0.14       Cardiac  Recent Labs   Lab 09/21/21  1435   TROP <0.045   PBNP 42       Creatinine Kinase  Recent Labs   Lab 09/21/21  1435   *       Inflammatory Markers  Recent Labs   Lab 09/26/21  0714 09/2

## 2021-09-28 NOTE — PROGRESS NOTES
Received pt on bipap, remained on all night. No changes made, will continue to monitor.         09/28/21 0514   BiPAP   $ RT Standby Charge (per 15 min) 1   Device V60   BiPAP / CPAP CE# 6081   Mode Spontaneous/Timed   Interface Full face mask   Mask Size L

## 2021-09-28 NOTE — PROGRESS NOTES
INFECTIOUS DISEASE PROGRESS NOTE    Douglas Lei Patient Status:  Inpatient    1965 MRN [id number]   [facility] 5NW-A Attending Bird Sutherland MD   Hosp Day # 7 PCP Alejandra Healy MD     Remdesivir no  Dexa D#7  alicia d#5  CXT d#5 of Systems:    Completed. See pertinent positives and negatives above    Physical Exam:    Vital signs: Blood pressure 116/65, pulse 63, temperature 98.2 °F (36.8 °C), temperature source Temporal, resp.  rate 24, height 5' 9\" (1.753 m), weight 259 lb 4.2 o 09/21/21. Radiology:   PROCEDURE: XR CHEST AP PORTABLE (CPT=71045)     TECHNIQUE: AP chest radiograph was obtained.      COMPARISON: [facility] , XR, XR CHEST AP PORTABLE (CPT=71045), 9/21/2021, 2:51 PM. [place], XR, XR CHEST AP/PA (1 VIEW) (CPT=71045),

## 2021-09-28 NOTE — PLAN OF CARE
Patient is awake, alert and oriented x4. On BIPAP FIO2 90%, sats 92-94%. Self prone every few hours, Tolerating well. Productive cough present. Cough medicines given. Other vital signs are stable at this time. Will continue to monitor the patient.

## 2021-09-28 NOTE — PLAN OF CARE
Received pt AOx4. Bipap FiO2 increased from 90 to 100% d/t O2 sats 87-88%. Only able to tolerate vapotherm enough to take PO medications- desats to 84%. Able to prone throughout day shift, able to wean FiO2 to 90% by RT. Productive cough present.  SR on tel

## 2021-09-28 NOTE — PAYOR COMM NOTE
--------------  CONTINUED STAY REVIEW----REQUESTING ADDITIONAL DAY 9/28      Payor: Mitch Mathis OF [facility]  Subscriber #:  [id number]  Authorization Number: [id number]    Admit date: 9/21/21  Admit time:  5:21 PM    Admitting Physician: Anoop Quezada MD Results     COVID-19        Lab Results   Component Value Date     COVID19 Detected (A) 09/16/2021         Pro-Calcitonin        Recent Labs   Lab 09/21/21  1435 09/23/21  0642 09/24/21  0633   PCT 0.06 0.08 0.14         Cardiac      Recent Labs   Lab 09/2 Date Action Dose Route User    9/28/2021 6563 Given  Oral Isabela Even, RN      benzonatate (TESSALON) cap 100 mg     Date Action Dose Route User    9/28/2021 0292 Given  Oral Isabela Even, RN    9/27/2021 2200 Given  Oral Tatiana Durant, RN      cefTRIAXone S

## 2021-09-29 ENCOUNTER — APPOINTMENT (OUTPATIENT)
Dept: GENERAL RADIOLOGY | Facility: HOSPITAL | Age: 56
DRG: 177 | End: 2021-09-29
Attending: INTERNAL MEDICINE
Payer: COMMERCIAL

## 2021-09-29 PROCEDURE — 5A0935Z ASSISTANCE WITH RESPIRATORY VENTILATION, LESS THAN 24 CONSECUTIVE HOURS: ICD-10-PCS | Performed by: INTERNAL MEDICINE

## 2021-09-29 PROCEDURE — 71045 X-RAY EXAM CHEST 1 VIEW: CPT | Performed by: INTERNAL MEDICINE

## 2021-09-29 PROCEDURE — 99232 SBSQ HOSP IP/OBS MODERATE 35: CPT | Performed by: STUDENT IN AN ORGANIZED HEALTH CARE EDUCATION/TRAINING PROGRAM

## 2021-09-29 RX ORDER — ALBUTEROL SULFATE 90 UG/1
8 AEROSOL, METERED RESPIRATORY (INHALATION) 4 TIMES DAILY
Status: DISCONTINUED | OUTPATIENT
Start: 2021-09-29 | End: 2021-10-12

## 2021-09-29 NOTE — DIETARY NOTE
[facility]    NUTRITION ASSESSMENT    Pt does not meet malnutrition criteria. NUTRITION INTERVENTION:    1. RD nutrition Care Plan- See RD nutrition assessment for additional recommendations   2. Meal and Snacks - monitor patient po intake.  Jenniffer Salas supplements to maximize  Food Allergies: No  Cultural/Ethnic/Buddhist Preferences Addresses: Yes    GI SYSTEM REVIEW: WNL    NUTRITION RELATED PHYSICAL FINDINGS:     1. Body Fat/Muscle Mass: KEILY     2.  Fluid Accumulation: none     NUTRITION PRESCRIPTION:

## 2021-09-29 NOTE — PROGRESS NOTES
[facility]  Progress Note    Vero Dowd Patient Status:  Inpatient    1965 MRN [id number]   [facility] 5NW-A Attending Sally Cee MD   Hosp Day # 8 PCP Aashish Ramirez MD     Subjective:  Vero Dowd is a(n) 55 year o 138 132*   K 4.6 5.3* 4.9    100 99   CO2 32.0 38.0* 32.0   ALKPHO 61 62 61   AST 27 28 28   ALT 47 50 49   BILT 0.4 0.5 0.5   TP 6.3* 6.7 6.8       Recent Labs   Lab 09/27/21  0459 09/28/21  0453 09/29/21  0457   MG 2.4 2.5 2.6       Lab Results   C 2 g, Intravenous, Q24H  pantoprazole (PROTONIX) EC tab 40 mg, 40 mg, Oral, QAM AC  benzonatate (TESSALON) cap 100 mg, 100 mg, Oral, TID PRN  sodium chloride 0.9% IV bolus 500 mL, 500 mL, Intravenous, PRN  guaiFENesin ER (MUCINEX) 12 hr tab 1,200 mg, 1,200 [facility]

## 2021-09-29 NOTE — PROGRESS NOTES
[facility]     Hospitalist Progress Note     Alisia Ospina Patient Status:  Inpatient    1965 MRN [id number]   [facility] 4SW-A Attending Bony Stevens MD   Hosp Day # 8 PCP Denver Mustafa MD     Chief Complaint: COVID pneumonia input(s): CK in the last 168 hours.     Inflammatory Markers  Recent Labs   Lab 09/26/21  0714 09/27/21  0459 09/28/21  0453   CRP 8.39* 4.65* 4.66*   MARYSOL 1,074.7* 1,017.3* 1,080.0*   * 327* 296*   DDIMER 0.46 0.40 0.48       Imaging: Imaging data re

## 2021-09-29 NOTE — PROGRESS NOTES
INFECTIOUS DISEASE PROGRESS NOTE    Shahid Beck Patient Status:  Inpatient    1965 MRN [id number]   [facility] 5NW-A Attending Dorian Crawford MD   Hosp Day # 8 PCP Ro Mills MD     Remdesivir no  Dexa D#8  alicia d#6  CXT d#6 (ROBITUSSIN) 100mg/5ml LIQUID 100 mg, 100 mg, Oral, Q4H PRN    Review of Systems:    Completed.  See pertinent positives and negatives above    Physical Exam:    Vital signs: Blood pressure 124/75, pulse 85, temperature 97.4 °F (36.3 °C), temperature source Microbiology    Reviewed in EMR,   No results found for this visit on 09/21/21. Radiology:    PROCEDURE: XR CHEST AP PORTABLE (CPT=71045)     TECHNIQUE: AP chest radiograph was obtained.      COMPARISON: [facility] , XR, XR CHEST AP PORTABLE (CPT=710

## 2021-09-29 NOTE — PLAN OF CARE
Patient is awake, alert and oriented x4. On BIPAP 90%. Stayed on prone position except to use urinal and drink water. Vital signs remains stable.

## 2021-09-29 NOTE — PLAN OF CARE
Patient received on 90% Bipap. FiO2 decreased to 80% per RT this AM.   Patient coughing up moderate amount of thick, white sputum. Tolerating self-proning.    Able to swallow pills and drink water with short breaks from Bipap.   2 ensure with ice cream s

## 2021-09-29 NOTE — PAYOR COMM NOTE
--------------  CONTINUED STAY REVIEW----REQUESTING ADDITIONAL DAY 9/29      [facility]  Subscriber #:  [id number]  Authorization Number: [id number]    Admit date: 9/21/21  Admit time:  5:21 PM    Admitting Physician: Ghislaine Armstrong MD 09/23/21  0642 09/24/21  0633   PCT 0.08 0.14         Cardiac  No results for input(s): TROP, PBNP in the last 168 hours.     Creatinine Kinase  No results for input(s): CK in the last 168 hours.     Inflammatory Markers        Recent Labs   Lab 09/26/21 NATHAN Batista      Baricitinib TABS 4 mg     Date Action Dose Route User    9/29/2021 0750 Given  Oral Elli Acuña, RN      benzonatate (TESSALON) cap 100 mg     Date Action Dose Route User    9/29/2021 1119 Given  Oral Grzegorz Do [facility]    9/28/2021

## 2021-09-30 PROCEDURE — 5A09457 ASSISTANCE WITH RESPIRATORY VENTILATION, 24-96 CONSECUTIVE HOURS, CONTINUOUS POSITIVE AIRWAY PRESSURE: ICD-10-PCS | Performed by: INTERNAL MEDICINE

## 2021-09-30 PROCEDURE — 99232 SBSQ HOSP IP/OBS MODERATE 35: CPT | Performed by: STUDENT IN AN ORGANIZED HEALTH CARE EDUCATION/TRAINING PROGRAM

## 2021-09-30 NOTE — PROGRESS NOTES
[facility]  Progress Note    Colt Venegas Patient Status:  Inpatient    1965 MRN [id number]   [facility] 4SW-A Attending Liliana Campo MD   [facility] Day # 9 PCP Thalia Holt MD     Subjective:  Colt Venegas is a(n) 55year old CREATSERUM 0.68* 0.59* 0.54*     No results for input(s): PTP, INR, PTT in the last 168 hours. Cultures: Rapid SARS +9/16    Radiology:  No results found.   Chest x-rays reviewed bibasilar and mid field infiltrates with some increase  CTA reviewed from

## 2021-09-30 NOTE — PLAN OF CARE
Prone all day except for when dangling at bedside with RN to drink water/ensure or use urinal.   Bipap remains at 100%, tolerating repositioning.

## 2021-09-30 NOTE — PLAN OF CARE
Patient awake, alert and oriented x4. Received On BIPAP 60%. Tried vapotherm 40 L/100%, sats 92% while sitting up and 97% while prone. Patient is in  prone all night long except to drink water and to use urinal. Supine at 0600, sats 82-88%, on BIPAP now.  C

## 2021-09-30 NOTE — PAYOR COMM NOTE
--------------  CONTINUED STAY REVIEW-----REQUESTING ADDITIONAL DAY 9/30      Payor: [facility]  Subscriber #:  [id number]  Authorization Number: [id number]    Admit date: 9/21/21  Admit time:  5:21 PM    Admitting Physician: Elan Herrera MD 09/24/21  0633   PCT 0.14         Cardiac  No results for input(s): TROP, PBNP in the last 168 hours.     Creatinine Kinase  No results for input(s): CK in the last 168 hours.     Inflammatory Markers        Recent Labs   Lab 09/27/21  0459 09/28/21  0450 Lynne Morton, NATHAN    9/29/2021 1938 Given  Inhalation Meghan Anderson, RCJAYLEN    9/29/2021 1540 Given  Inhalation Ian Vinson, [facility]      Baricitinib TABS 4 mg     Date Action Dose Route User    9/30/2021 2623 Given  Oral Halie Crow RN      polina

## 2021-09-30 NOTE — PROGRESS NOTES
09/29/21 1541   BiPAP   $ RT Standby Charge (per 15 min) 1   Device V60   BiPAP / CPAP CE# 6081   BiPAP bacteria filter Yes   Mode Spontaneous/Timed   Interface Full face mask   Mask Size Large   Control Settings   Set Rate 14 breaths/min   Set IPAP 12

## 2021-09-30 NOTE — PHYSICAL THERAPY NOTE
Order received for PT eval per functional mobility screen and chart reviewed. Pt currently on bipap and desaturates with activity. Discussed with RN that will plan to follow up 10/4/21.

## 2021-09-30 NOTE — PROGRESS NOTES
[facility]     Hospitalist Progress Note     Jovanni Martin Patient Status:  Inpatient    1965 MRN [id number]   [facility] 4SW-A Attending Tiffani Meneses MD   Hosp Day # 5 PCP Bryn Fregoso MD     Chief Complaint: COVID pneumonia hours.    Inflammatory Markers  Recent Labs   Lab 09/27/21  0459 09/28/21  0453 09/30/21  0424   CRP 4.65* 4.66* 1.89*   MARYSOL 1,017.3* 1,080.0* 1,132.2*   * 296* 346*   DDIMER 0.40 0.48 0.34       Imaging: Imaging data reviewed in Epic.     Medication

## 2021-09-30 NOTE — PROGRESS NOTES
INFECTIOUS DISEASE PROGRESS NOTE    Oral Boone Patient Status:  Inpatient    1965 MRN [id number]   [facility] 5NW-A Attending Levi Pitts MD   Hosp Day # 9 PCP Phillip Valentine MD     Remdesivir no  Dexa D#9  alicia d#7  CXT d#7 injection 40 mg, 40 mg, Subcutaneous, Daily  •  guaiFENesin (ROBITUSSIN) 100mg/5ml LIQUID 100 mg, 100 mg, Oral, Q4H PRN    Review of Systems:    Completed.  See pertinent positives and negatives above    Physical Exam:    Vital signs: Blood pressure 130/86, 346*   DDIMER 0.40 0.48 0.34       Microbiology    Reviewed in EMR,   No results found for this visit on 09/21/21. Radiology:    PROCEDURE: XR CHEST AP PORTABLE (CPT=71045)     TECHNIQUE: AP chest radiograph was obtained.      COMPARISON: [facility] , XR, cx    4. Suspected RLL pna- on CTX  -sputum cx if able    D/w RN and Doris Ordoñez and Dr Tito Ramsey  Will cont to follow    Betsy Zhu MD

## 2021-09-30 NOTE — OCCUPATIONAL THERAPY NOTE
OT orders received, chart reviewed and noted increased o2 needs today and on BIPAP. Will hold for now and continue to follow and assess when medically appropriate.

## 2021-09-30 NOTE — CM/SW NOTE
CM reviewed chart. Covid+/unvaccinated. Patient on BiPap, desaturates with supine/standing. Will follow for Home O2 needs at DC.  &  to remain available and supportive for discharge planning needs.     Shadia Mann RN Case Man

## 2021-09-30 NOTE — PROGRESS NOTES
Patient remained on vapotherm overnight. No RT changes made. Will continue to monitor. 09/30/21 0329   Oxygen Utilization   $ RT Standby Charge (per 15 min) 1   $ Oxygen in use?  Yes   O2 Device High flow/High humidity   O2 Flow Rate (L/min) 40 L/min

## 2021-10-01 PROCEDURE — 99233 SBSQ HOSP IP/OBS HIGH 50: CPT | Performed by: STUDENT IN AN ORGANIZED HEALTH CARE EDUCATION/TRAINING PROGRAM

## 2021-10-01 PROCEDURE — 5A0955Z ASSISTANCE WITH RESPIRATORY VENTILATION, GREATER THAN 96 CONSECUTIVE HOURS: ICD-10-PCS | Performed by: INTERNAL MEDICINE

## 2021-10-01 PROCEDURE — 5A0935Z ASSISTANCE WITH RESPIRATORY VENTILATION, LESS THAN 24 CONSECUTIVE HOURS: ICD-10-PCS | Performed by: INTERNAL MEDICINE

## 2021-10-01 NOTE — PROGRESS NOTES
INFECTIOUS DISEASE PROGRESS NOTE    Nazanin Bridges Patient Status:  Inpatient    1965 MRN [id number]   [facility] 5NW-A Attending Chirag Portillo MD   Hosp Day # 10 PCP Gilmer Carlos MD     Remdesivir no  Dexa D#10  alicia d#7  CXT d#7 LIQUID 100 mg, 100 mg, Oral, Q4H PRN    Review of Systems:    Completed. See pertinent positives and negatives above    Physical Exam:    Vital signs: Blood pressure 123/73, pulse 102, temperature 98.3 °F (36.8 °C), temperature source Temporal, resp.  rate found for this visit on 09/21/21. Radiology:    PROCEDURE: XR CHEST AP PORTABLE (CPT=71045)     TECHNIQUE: AP chest radiograph was obtained.      COMPARISON: [facility] , XR, XR CHEST AP PORTABLE (CPT=71045), 9/26/2021, 10:14 AM.     INDICATIONS: hypoxia

## 2021-10-01 NOTE — PROGRESS NOTES
[facility]     Hospitalist Progress Note     Monserrat Lin Patient Status:  Inpatient    1965 MRN [id number]   [facility] 4SW-A Attending Alice Gibson MD   Hosp Day # 10 PCP Kate Kidd MD     Chief Complaint: Hamp Sober hours.    Inflammatory Markers  Recent Labs   Lab 09/28/21  0453 09/30/21  0424 10/01/21  0457   CRP 4.66* 1.89* 1.93*   MARYSOL 1,080.0* 1,132.2* 889.1*   * 346* 352*   DDIMER 0.48 0.34 0.33       Imaging: Imaging data reviewed in Epic.     Medications:

## 2021-10-01 NOTE — DIETARY NOTE
[facility]    NUTRITION ASSESSMENT    Pt does not meet malnutrition criteria. NUTRITION INTERVENTION:    1. Meal and Snacks - monitor patient po intake. Encourage adequate po of appropriate diet.   2. Medical Food Supplements -RD increased reta Ensu Meals Eaten (%)    09/29/21 1043 100 %     09/30/21 1323 100 %     09/30/21 1800 100 %     10/01/21 0830 90 %    Comments:   Percent Meals Eaten (%): ensure with ice cream at 09/29/21 1043   Percent Meals Eaten (%): ensure and ice cream shake at 09/30/21 1

## 2021-10-01 NOTE — PLAN OF CARE
Assumed care of pt after shift report. Received pt resting in bed. Alert and oriented x4. Received pt on Bipap 70%. Transitioned to vapotherm 40L/100% this AM. Bipap used intermittently during increased activity, dyspnea noted with exertion.  Proning throug

## 2021-10-01 NOTE — PAYOR COMM NOTE
--------------  CONTINUED STAY REVIEW------REQUESTING ADDITIONAL DAY 10/1      Payor: [address] [employer]  Subscriber #:  [id number]  Authorization Number: [id number]    Admit date: 9/21/21  Admit time:  5:21 PM    Admitting Physician: Ade Wen 168 hours.     Cardiac  No results for input(s): TROP, PBNP in the last 168 hours.     Creatinine Kinase  No results for input(s): CK in the last 168 hours.     Inflammatory Markers  Recent Labs   Lab 09/28/21  0453 09/30/21  0424 10/01/21  0457   CRP 4.66 RCP    9/30/2021 1946 Given  Inhalation Frannie Lot, RCP    9/30/2021 1610 Given  Inhalation Daysi Boone, NATHAN      Baricitinib TABS 4 mg     Date Action Dose Route User    10/1/2021 1192 Given  Oral Ashley Almazan, RN      benzonatate (TESSALON) cap 100 mg

## 2021-10-01 NOTE — PROGRESS NOTES
[facility]  Progress Note    Deonte Carrillo Patient Status:  Inpatient    1965 MRN [id number]   [facility] 5NW-A Attending Bri Prather MD   Hosp Day # 10 PCP Benedict Myles MD     Subjective:  Deonte Carrillo is a(n) 55 year 0.51*   GFRAA 132 137 140   GFRNAA 114 118 121   CA 9.5 9.2 9.4   ALB 2.1* 2.2* 2.3*   * 134* 133*   K 4.9 4.7 4.8   CL 99 100 99   CO2 32.0 32.0 32.0   ALKPHO 61 61 66   AST 28 34 26   ALT 49 69* 61   BILT 0.5 0.4 0.5   TP 6.8 6.3* 6.7       Recent 0.9% 100 mL IVPB-ADDV, 2 g, Intravenous, Q24H  pantoprazole (PROTONIX) EC tab 40 mg, 40 mg, Oral, QAM AC  benzonatate (TESSALON) cap 100 mg, 100 mg, Oral, TID PRN  sodium chloride 0.9% IV bolus 500 mL, 500 mL, Intravenous, PRN  guaiFENesin ER (MUCINEX) 12

## 2021-10-01 NOTE — PLAN OF CARE
Alert and oriented, congenial.  Denies dyspnea at rest (although I note dyspnea with exertion), denies nausea and pain.   Lungs are very diminished, productive cough thin white sputum, on BiPAP 12/6 0.9 but also tolerated Vapotherm 40/100 for 15 minutes to

## 2021-10-02 PROCEDURE — 99232 SBSQ HOSP IP/OBS MODERATE 35: CPT | Performed by: STUDENT IN AN ORGANIZED HEALTH CARE EDUCATION/TRAINING PROGRAM

## 2021-10-02 RX ORDER — DEXAMETHASONE SODIUM PHOSPHATE 4 MG/ML
6 VIAL (ML) INJECTION EVERY 24 HOURS
Status: DISCONTINUED | OUTPATIENT
Start: 2021-10-02 | End: 2021-10-05

## 2021-10-02 NOTE — PROGRESS NOTES
Pt A0x4. Pt reports mild anxiety but manageable w/o meds. SOB noted w/ minimal activity. Desaturation with coughing, movement, eating. Encouraged IS and deep breathing. Prone position as able. Vapotherm remains at 40L/100%. Productive cough.  Pt reports inc

## 2021-10-02 NOTE — PROGRESS NOTES
[facility]                INFECTIOUS DISEASE PROGRESS NOTE    Christine Abbottvamsi Patient Status:  Inpatient    1965 MRN [id number]   [facility] 4SW-A Attending Jeff Marroquin MD   Hosp Day # 6 PCP Vin Shahid MD     Deca#11 Psoriasis     Lymphadenopathy, retroperitoneal     Follicular lymphoma ([facility] [address]     Erectile dysfunction     Pneumonia due to COVID-19 virus     Hypoxia     Acute respiratory failure with hypoxia (HCC)            ASSESSMENT/PLAN:  1.  COVID-19 pneumonia  Unvac

## 2021-10-02 NOTE — PROGRESS NOTES
[facility]  Progress Note    Bart Pulido Patient Status:  Inpatient    1965 MRN [id number]   [facility] 4SW-A Attending Herson Tavera MD   [facility] Day # 6 PCP Esperanza Berman MD     Subjective:  Bart Pulido is a(n) 55year old INR, PTT in the last 168 hours. Cultures: Sputum culture without WBCs remains pending    Radiology:  No results found.   Chest x-rays reviewed increasing bilateral pulmonary infiltrates      Medications reviewed     Assessment and Plan:   Patient Active

## 2021-10-02 NOTE — PROGRESS NOTES
[facility]     Hospitalist Progress Note     [facility]:  [facility]    1965 MRN [id number]   [facility] 4SW-A Attending Rk López MD   Hosp Day # 6 PCP Benedict Myles MD     Chief Complaint: Rg Alonso hours.    Inflammatory Markers  Recent Labs   Lab 09/30/21  0424 10/01/21  0457 10/02/21  0444   CRP 1.89* 1.93* 3.15*   MARYSOL 1,132.2* 889.1* 919.0*   * 352* 252*   DDIMER 0.34 0.33 0.27       Imaging: Imaging data reviewed in Epic.     Medications:

## 2021-10-02 NOTE — PLAN OF CARE
Alert and oriented, congenial.  Denies dyspnea at rest, states dyspnea with exertion, denies nausea and pain.   Lungs are diminished with crackles to left posterior base, productive cough thin white sputum, initially on BiPAP to get caught up after coughing

## 2021-10-03 PROCEDURE — 99232 SBSQ HOSP IP/OBS MODERATE 35: CPT | Performed by: STUDENT IN AN ORGANIZED HEALTH CARE EDUCATION/TRAINING PROGRAM

## 2021-10-03 RX ORDER — FUROSEMIDE 10 MG/ML
20 INJECTION INTRAMUSCULAR; INTRAVENOUS ONCE
Status: COMPLETED | OUTPATIENT
Start: 2021-10-03 | End: 2021-10-03

## 2021-10-03 NOTE — PROGRESS NOTES
[facility]                INFECTIOUS DISEASE PROGRESS NOTE    Lucia Delacruz Patient Status:  Inpatient    1965 MRN [id number]   [facility] 4SW-A Attending Kristin Murillo MD   Hosp Day # 15 PCP Zina Marinelli MD     Deca#12 right cervical region     Psoriasis     Lymphadenopathy, retroperitoneal     Follicular lymphoma ([facility] [address]     Erectile dysfunction     Pneumonia due to COVID-19 virus     Hypoxia     Acute respiratory failure with hypoxia (HCC)            ASSESSMENT/PLAN:  1.

## 2021-10-03 NOTE — PROGRESS NOTES
[facility]  Progress Note    Shahid Beck Patient Status:  Inpatient    1965 MRN [id number]   [facility] 4SW-A Attending Reny Rojas MD   [facility] Day # 15 PCP Ro Mills MD     Subjective:  Shahid Beck is a(n) 55year old found.  Chest x-ray is reviewed      Medications reviewed     Assessment and Plan:   Patient Active Problem List:     Cavovarus deformity of foot, acquired     Obesity     Lymphadenopathy, inguinal     Lymphadenopathy of right cervical region     Psoriasis

## 2021-10-03 NOTE — PROGRESS NOTES
[facility]     Hospitalist Progress Note     Elyblayne Garcias Patient Status:  Inpatient    1965 MRN [id number]   [facility] 4SW-A Attending Ade Mendes MD   Hosp Day # 15 PCP Hamzah Nichols MD     Chief Complaint: Emil Casillas hours.    Inflammatory Markers  Recent Labs   Lab 10/01/21  0457 10/02/21  0444 10/03/21  0522   CRP 1.93* 3.15* 2.20*   MARYSOL 889.1* 919.0* 916.1*   * 252* 260*   DDIMER 0.33 0.27 0.62*       Imaging: Imaging data reviewed in Epic.     Medications:

## 2021-10-03 NOTE — PLAN OF CARE
Assumed care of pt this afternoon following RN report. Pt awake, oriented x 4. Pt remains on vapotherm 100%/40L. Dyspnea with any activity. Productive cough noted. NSR-ST per monitor. Tolerating diet. Voiding per urinal. Denies any pain.  Self proning as ab

## 2021-10-04 ENCOUNTER — APPOINTMENT (OUTPATIENT)
Dept: GENERAL RADIOLOGY | Facility: HOSPITAL | Age: 56
DRG: 177 | End: 2021-10-04
Attending: STUDENT IN AN ORGANIZED HEALTH CARE EDUCATION/TRAINING PROGRAM
Payer: COMMERCIAL

## 2021-10-04 PROCEDURE — 71045 X-RAY EXAM CHEST 1 VIEW: CPT | Performed by: STUDENT IN AN ORGANIZED HEALTH CARE EDUCATION/TRAINING PROGRAM

## 2021-10-04 PROCEDURE — 99232 SBSQ HOSP IP/OBS MODERATE 35: CPT | Performed by: STUDENT IN AN ORGANIZED HEALTH CARE EDUCATION/TRAINING PROGRAM

## 2021-10-04 RX ORDER — FUROSEMIDE 10 MG/ML
20 INJECTION INTRAMUSCULAR; INTRAVENOUS ONCE
Status: COMPLETED | OUTPATIENT
Start: 2021-10-04 | End: 2021-10-04

## 2021-10-04 NOTE — OCCUPATIONAL THERAPY NOTE
Occupational Therapy follow-up note. Spoke with RN. Not appropriate for therapy today. Respiratory status closely being monitored. Will continue to follow.

## 2021-10-04 NOTE — PLAN OF CARE
Patient is awake, alert and oriented x4. On Vapotherm 40 L/100%, sats 91 while supine, 97% while in prone position. Up in the chair while gave bath, back to bed and in prone position at 2130. Tolerating well. Coughing up white secretions.  Tessalon cap give

## 2021-10-04 NOTE — PAYOR COMM NOTE
--------------  CONTINUED STAY REVIEW----REQUESTING ADDITIONAL DAYS 10/3 AND 10/4      Payor: [address] [facility]  Subscriber #:  [id number]  Authorization Number: [id number]    Admit date: 9/21/21  Admit time:  5:21 PM    Admitting Physician: Jennifer Castle, in the last 168 hours.     Cardiac  No results for input(s): TROP, PBNP in the last 168 hours.     Creatinine Kinase  No results for input(s): CK in the last 168 hours.     Inflammatory Markers        Recent Labs   Lab 10/01/21  0457 10/02/21  0444 10/03/2 °C)  Pulse:  [] 93  Resp:  [19-39] 27  BP: (102-142)/(60-86) 120/74  FiO2 (%):  [95 %-100 %] 100 %     Physical Exam:    General: No acute distress. Respiratory: Crackles  Abdomen: Soft, nontender, nondistended. Positive bowel sounds.  No rebound o mg Oral BID   • Senna  8.6 mg Oral BID   • baricitinib  4 mg Oral Daily   • pantoprazole  40 mg Oral QAM AC   • guaiFENesin ER  1,200 mg Oral BID   • enoxaparin  40 mg Subcutaneous Daily         Assessment & Plan:       · Acute hypoxic resp failure due to Subcutaneous (Left Lower Abdomen) Destiny Almanzar, RN      furosemide (LASIX) injection 20 mg     Date Action Dose Route User    10/3/2021 1309 Given  Intravenous Barthel, Kati L, RN      guaiFENesin ER (MUCINEX) 12 hr tab 1,200 mg     Date Action Dose Rou

## 2021-10-04 NOTE — PROGRESS NOTES
[facility]     Hospitalist Progress Note     Vero Dowd Patient Status:  Inpatient    1965 MRN [id number]   [facility] 4SW-A Attending Gina Benjamin MD   Hosp Day # 15 PCP Aashish Ramirez MD     Chief Complaint: Verita Severs last 168 hours.     Inflammatory Markers  Recent Labs   Lab 10/01/21  0457 10/01/21  0457 10/02/21  0444 10/03/21  0522 10/04/21  0509   CRP 1.93*   < > 3.15* 2.20* 9.49*   MARYSOL 889.1*  --  919.0* 916.1*  --    *  --  252* 260*  --    DDIMER 0.33   <

## 2021-10-04 NOTE — PROGRESS NOTES
[facility]  Progress Note    Bozena Avila Patient Status:  Inpatient    1965 MRN [id number]   [facility] 5NW-A Attending Evelin Farias MD   [facility] Day # 15 PCP María Perez MD     Subjective:  Bozena Avila is a(n) 55 year GFRAA 131 131 145   GFRNAA 113 113 125   CA 9.2 9.1 9.3   ALB 2.1* 2.3* 2.2*    138 139   K 4.4 3.9 4.1    104 104   CO2 30.0 30.0 33.0*   ALKPHO 60 68 63   AST 24 30 24   ALT 52 68* 63*   BILT 0.3 0.4 0.4   TP 6.2* 6.5 6.4       Recent Labs Daily  pantoprazole (PROTONIX) EC tab 40 mg, 40 mg, Oral, QAM AC  benzonatate (TESSALON) cap 100 mg, 100 mg, Oral, TID PRN  sodium chloride 0.9% IV bolus 500 mL, 500 mL, Intravenous, PRN  guaiFENesin ER (MUCINEX) 12 hr tab 1,200 mg, 1,200 mg, Oral, BID  Al

## 2021-10-04 NOTE — CM/SW NOTE
Care Progression Note:  Active Acute Medical Issue:   Pneumonia due to COVID-19 virus     Other Contributing Medical Factors/Dx: acute     Acute hypoxic resp failure, Leukocytosis, Suspected RLL/ RML pna- completed CTX, hyponatremia, obesity    Length of s

## 2021-10-04 NOTE — PROGRESS NOTES
INFECTIOUS DISEASE PROGRESS NOTE    Yasir Mess Patient Status:  Inpatient    1965 MRN [id number]   [facility] 5NW-A Attending Leighton Weinstein MD   Hosp Day # 15 PCP Rachel Dutta MD     Remdesivir no  Dexa D#13  alicia d#10  Sp CXT 100 °F (37.8 °C), temperature source Temporal, resp. rate 26, height 5' 9\" (1.753 m), weight 249 lb 5.4 oz (113.1 kg), SpO2 90 %.       Laboratory Data:    Recent Labs   Lab 10/02/21  0444 10/03/21  0522 10/04/21  0509   RBC 5.02 5.33 5.09   HGB 14.8 15.3 PORTABLE (CPT=71045)     TECHNIQUE: AP chest radiograph was obtained.      COMPARISON: [facility] , XR, XR CHEST AP PORTABLE (CPT=71045), 9/26/2021, 10:14 AM.     INDICATIONS: hypoxia     PATIENT STATED HISTORY: (As transcribed by Technologist) Patient offered

## 2021-10-04 NOTE — PHYSICAL THERAPY NOTE
Attempted to see patient for Physical Therapy evaluation. RN reports continues with increased O2 requirements with desaturations with minimal activity. Will check status and proceed as appropriate.

## 2021-10-04 NOTE — PLAN OF CARE
Tolerating 40L 100% vapotherm. Prone all day except while sitting up to use urinal and to eat meals. Productive cough with moderate amount thick, white sputum.

## 2021-10-05 PROCEDURE — 99232 SBSQ HOSP IP/OBS MODERATE 35: CPT | Performed by: STUDENT IN AN ORGANIZED HEALTH CARE EDUCATION/TRAINING PROGRAM

## 2021-10-05 NOTE — PROGRESS NOTES
[facility]     Hospitalist Progress Note     Melissa Adams Patient Status:  Inpatient    1965 MRN [id number]   [facility] 4SW-A Attending Cale Rosa MD   Hosp Day # 15 PCP Henri Cornell MD     Chief Complaint: Cameron Corona <0.05       Cardiac  No results for input(s): TROP, PBNP in the last 168 hours. Creatinine Kinase  No results for input(s): CK in the last 168 hours.     Inflammatory Markers  Recent Labs   Lab 10/02/21  0444 10/02/21  0444 10/03/21  0522 10/04/21  8659

## 2021-10-05 NOTE — PROGRESS NOTES
[facility] Note: Route Optimization for Dexamethasone (Decadron)    Patient is currently on Dexamethasone (Decadron) 6 mg IV every 24 hours.    The patient meets the criteria to convert to the oral equivalent as established by the IV to Oral conversion pr

## 2021-10-05 NOTE — PAYOR COMM NOTE
--------------  CONTINUED STAY REVIEW-----REQUESTING ADDITIONAL DAY 10/5      Payor: [facility]  Subscriber #:  [id number]  Authorization Number: [id number]    Admit date: 9/21/21  Admit time:  5:21 PM    Admitting Physician: Alice Salazar MD Results     COVID-19        Lab Results   Component Value Date     COVID19 Detected (A) 09/16/2021         Pro-Calcitonin      Recent Labs   Lab 10/04/21  0509   PCT <0.05         Cardiac  No results for input(s): TROP, PBNP in the last 168 hours.     Crea to be discharge to: home                   MEDICATIONS ADMINISTERED IN LAST 1 DAY:  albuterol 108 (90 Base) MCG/ACT inhaler 8 puff     Date Action Dose Route User    10/5/2021 1301 Given  Inhalation Al Bustamante RN    10/5/2021 9304 Given  Inhalat

## 2021-10-05 NOTE — DIETARY NOTE
[facility]    NUTRITION ASSESSMENT    Pt does not meet malnutrition criteria at this time. NUTRITION INTERVENTION:    1. Meal and Snacks - monitor patient po intake. Encourage adequate po of appropriate diet.   2. [address] CELINE Freitas NUTRITION:  Diet: Orders Placed This Encounter      Regular/General diet Is Patient on Accuchecks?  No     Oral Supplements: Ensure Enlive BID; at breakfast and dinner and Ensure High Protein at lunch    Percent Meals Eaten (last 3 days)     Date/Time Per

## 2021-10-05 NOTE — PROGRESS NOTES
[facility] Pulmonary/Critical Care Progress Note     SUBJECTIVE/Interval history: All events, procedures, notes reviewed. pt proned well overnight and now sitting in chair on Vapotherm 100% (85% when proning overnight) 3.9 4.1 4.3    104 104   CO2 30.0 33.0* 34.0*     Recent Labs   Lab 10/03/21  0522 10/04/21  0509 10/05/21  0444   RBC 5.33 5.09 5.42   HGB 15.3 15.0 15.7   HCT 49.1 46.5 49.1   MCV 92.1 91.4 90.6   MCH 28.7 29.5 29.0   MCHC 31.2 32.3 32.0   RDW 12. 4 acetaminophen, melatonin, ondansetron, Prochlorperazine Edisylate, guaiFENesin    ASSESSMENT    · Acute hypoxic respiratory failure due to COVID pneumonia  · COVID pneumonia, unvaccinated  · Possible bacterial superinfection given dense consolidation in RL

## 2021-10-06 PROCEDURE — 99233 SBSQ HOSP IP/OBS HIGH 50: CPT | Performed by: INTERNAL MEDICINE

## 2021-10-06 NOTE — PROGRESS NOTES
[facility]     Hospitalist Progress Note     Luz Poon Patient Status:  Inpatient    1965 MRN [id number]   [facility] 4SW-A Attending Brianne Lee MD   Hosp Day # 13 PCP Benton Mayorga MD     Chief Complaint: Socorro Perkins Value Date    COVID19 Detected (A) 09/16/2021       Pro-Calcitonin  Recent Labs   Lab 10/04/21  0509   PCT <0.05       Cardiac  No results for input(s): TROP, PBNP in the last 168 hours.     Creatinine Kinase  No results for input(s): CK in the last 168 santos

## 2021-10-06 NOTE — PAYOR COMM NOTE
--------------  CONTINUED STAY REVIEW----REQUESTING ADDITIONAL DAY 10/6      Payor: [address] [employer]  Subscriber #:  [id number]  Authorization Number: [id number]    Admit date: 9/21/21  Admit time:  5:21 PM    Admitting Physician: Mic Handley MD on SCr of 0.45 mg/dL (L)).     No results for input(s): PTP, INR in the last 168 hours.        COVID-19 Lab Results     COVID-19        Lab Results   Component Value Date     COVID19 Detected (A) 09/16/2021         Pro-Calcitonin      Recent Labs   Lab 10/ discharge: tbd  Discharge is dependent on: O2 needs   At this point Mr. Sophia Alexis is expected to be discharge to: home           MEDICATIONS ADMINISTERED IN LAST 1 DAY:  albuterol 108 (90 Base) MCG/ACT inhaler 8 puff     Date Action Dose Route User    10/6/ User    10/6/2021 9049 Given  Oral Tito Carranza RN          Procedures:      Plan:

## 2021-10-06 NOTE — PROGRESS NOTES
INFECTIOUS DISEASE PROGRESS NOTE    Melissa Bryan Patient Status:  Inpatient    1965 MRN [id number]   [facility] 5NW-A Attending Hui Mandel MD   Hosp Day # 15 PCP Henri Cornell MD     Remdesivir no  Erich D#14  alicia d#11  Sp CXT Systems:    Completed. See pertinent positives and negatives above    Physical Exam:    Vital signs: Blood pressure 116/67, pulse 80, temperature 98 °F (36.7 °C), resp. rate (!) 32, height 5' 9\" (1.753 m), weight 249 lb 5.4 oz (113.1 kg), SpO2 93 %.   Caden Sadia Marx in the last 168 hours.      COVID-19 Lab Results    COVID-19  Lab Results   Component Value Date    COVID19 Detected (A) 09/16/2021       Pro-Calcitonin  Recent Labs   Lab 10/04/21  0509   PCT <0.05       Cardiac  No results for input(s): TROP, hypoxia  - refused RDV  - on alicia d#11. Some slow improvement now.  IL-6 remains WNL, actemra not given  - monitor O2 needs, wean O2 as able  - prone as able, otherwise encourage ambulation and IS  - trend inflammatory markers  - symptomatic treatment of co

## 2021-10-06 NOTE — PROGRESS NOTES
[facility] Pulmonary/Critical Care Progress Note     SUBJECTIVE/Interval history: All events, procedures, notes reviewed. Pt is sitting in chair comfortably. No cp or worsening sob. Woke up at 3 am and read.        Revie GFRNAA 125 120 127   CA 9.3 9.7 9.3    139 137   K 4.1 4.3 4.2    104 104   CO2 33.0* 34.0* 31.0     Recent Labs   Lab 10/04/21  0509 10/05/21  0444 10/06/21  0519   RBC 5.09 5.42 5.07   HGB 15.0 15.7 14.6   HCT 46.5 49.1 45.4   MCV 91.4 90.6 benzonatate, sodium chloride 0.9%, albuterol, acetaminophen, melatonin, ondansetron, Prochlorperazine Edisylate, guaiFENesin    ASSESSMENT    · Acute hypoxic respiratory failure due to COVID pneumonia  · COVID pneumonia, unvaccinated  · Possible bacterial

## 2021-10-06 NOTE — PLAN OF CARE
Patient is awake, alert and oriented x4. Sitting up in chair. Prone well. On Vapotherm 40 L/ 90%. Weaned down to 60%. Prone position all night. Vital signs remains stable at this time. Will continue to monitor the patient.

## 2021-10-06 NOTE — PROGRESS NOTES
INFECTIOUS DISEASE PROGRESS NOTE    oJvanni Reyesbessie Patient Status:  Inpatient    1965 MRN [id number]   [facility] 5NW-A Attending Serena Thompson MD   Hosp Day # 13 PCP MD Ophelia Gil D#15  alicia d#12  Sp CXT 102, temperature 98.3 °F (36.8 °C), temperature source Temporal, resp. rate (!) 35, height 5' 9\" (1.753 m), weight 249 lb 5.4 oz (113.1 kg), SpO2 92 %.       Laboratory Data:    Recent Labs   Lab 10/02/21  0444 10/02/21  0444 10/03/21  0522 10/03/21  0522 hours.    Inflammatory Markers  Recent Labs   Lab 10/03/21  0522 10/03/21  0522 10/04/21  0509 10/05/21  0440 10/05/21  0444 10/06/21  0519   CRP 2.20*   < > 9.49*  --  5.79* 2.23*   MARYSOL 916.1*  --   --   --  886.5* 889.7*   *  --   --   --  212 227

## 2021-10-06 NOTE — PLAN OF CARE
Assumed care of patient following previous RN, tolerating VT 40L/80% attempting to wean FIO2 as tolerated. Ambulates independently to bedside commode/chair, improving appetite per patient, urine output >1L. VSS.  FMLA paperwork completed and placed in chart

## 2021-10-06 NOTE — PHYSICAL THERAPY NOTE
PT order received 9/29/21 via functional mobility screen. Pt being followed peripherally and unable to participate in skilled therapy due to tenuous respiratory status and poor activity tolerance.  Discussed with RN this morning that patient is able to capone

## 2021-10-07 PROCEDURE — 99233 SBSQ HOSP IP/OBS HIGH 50: CPT | Performed by: INTERNAL MEDICINE

## 2021-10-07 RX ORDER — LACTULOSE 10 G/15ML
30 SOLUTION ORAL ONCE
Status: DISCONTINUED | OUTPATIENT
Start: 2021-10-07 | End: 2021-10-12

## 2021-10-07 NOTE — PLAN OF CARE
Received patient this morning A&Ox4 on vapotherm. Patient up to chair for meals and proning while in bed. Vapotherm titrated to maintain sats >88%. Ranges from 30-40L and 65-80%. Isolation for Covid discontinued today per clearance from ID.  Good appetite a

## 2021-10-07 NOTE — PLAN OF CARE
Received pt A&Ox4 up in chair on vapotherm. Denies pain. Self proned around 2130, remained proned throughout the night. Voids via urinal, adequate urine output. Afebrile. VSS. NSR/ST on monitor. See flowsheets. See MAR. Will continue to monitor.

## 2021-10-07 NOTE — PAYOR COMM NOTE
--------------  CONTINUED STAY REVIEW-----REQUESTING ADDITIONAL DAY 10/7      Payor: [facility]  Subscriber #:  [id number]  Authorization Number: [id number]    Admit date: 9/21/21  Admit time:  5:21 PM    Admitting Physician: Ori Gallegos MD [phone]   CO2 33.0*   < > 34.0* 31.0 29.0   ALKPHO 63  --  64 67  --    AST 24  --  22 23  --    ALT 63*  --  73* 72*  --    BILT 0.4  --  0.4 0.4  --    TP 6.4  --  6.4 6.4  --     < > = values in this interval not displayed.         Estimated Creatin discussed with patient      Jerilee Lefort, MD        Supplementary Documentation:      Quality:  · DVT Prophylaxis: Lovenox  · CODE status: full  · Watt: no  · Central line: no  · If COVID testing is negative, may discontinue isolation: no      Will the Oral Sonya Mickey      [facility] tab 8.6 mg     Date Action Dose Route User    10/7/2021 4987 Given  Oral Sonya Mickey    10/6/2021 2000 Given  Oral Loraine Shen, RN      dexamethasone (DECADRON) tab 6 mg     Date Action Dose Route Us

## 2021-10-07 NOTE — PROGRESS NOTES
[facility]     Hospitalist Progress Note     Joenathan Frankel Patient Status:  Inpatient    1965 MRN [id number]   [facility] 4SW-A Attending Valentina Royal MD   Hosp Day # 12 PCP Isa Botello MD     Chief Complaint: Meredith Marino TP 6.4  --  6.4 6.4  --     < > = values in this interval not displayed. Estimated Creatinine Clearance: 194.1 mL/min (A) (based on SCr of 0.43 mg/dL (L)). No results for input(s): PTP, INR in the last 168 hours.          COVID-19 Lab Results negative, may discontinue isolation: no     Will the patient be referred to TCC on discharge?: no  Estimated date of discharge: tbd  Discharge is dependent on: O2 needs   At this point Mr. Beronica Mccormick is expected to be discharge to: home

## 2021-10-07 NOTE — PROGRESS NOTES
[facility]  Progress Note    Melissa Adams Patient Status:  Inpatient    1965 MRN [id number]   [facility] 4SW-A Attending Navi Gallegos MD   [facility] Day # 12 PCP Henri Cornell MD     Subjective:  Melissa Adams is a(n) 55 year respiratory lori beta-hemolytic strep not group A--repeat 10/4 remains normal respiratory lori    Radiology:  No results found.   Chest x-ray 10/4 with increasing consolidation right lateral left lower lobe no evidence of effusions      Medications review

## 2021-10-08 PROCEDURE — 99233 SBSQ HOSP IP/OBS HIGH 50: CPT | Performed by: INTERNAL MEDICINE

## 2021-10-08 NOTE — PROGRESS NOTES
INFECTIOUS DISEASE PROGRESS NOTE    Earnstine Chyle Patient Status:  Inpatient    1965 MRN [id number]   [facility] 5NW-A Attending Faye Nguyen MD   Hosp Day # 16 PCP Graham Redd MD     Remdesivir no  Dexa D#17  alicia d#14  Sp CXT mg, 100 mg, Oral, Q4H PRN      Physical Exam:    Vital signs: Blood pressure 128/75, pulse (!) 122, temperature 97.3 °F (36.3 °C), temperature source Temporal, resp. rate (!) 28, height 5' 9\" (1.753 m), weight 249 lb 5.4 oz (113.1 kg), SpO2 (!) 89 %.   Alysia 143   GFRNAA 120   < > 127 130 124  124   CA 9.7   < > 9.3 9.4 9.5  9.5   ALB 2.2*  --  2.4*  --  2.6*      < > 137 140 137  137   K 4.3   < > 4.2 3.8 3.8  3.8      < > 104 107 105  105   CO2 34.0*   < > 31.0 29.0 29.0  29.0   ALKPHO 64  --  67 improving slowly  - prone as able, otherwise encourage ambulation and IS  - trend inflammatory markers  - symptomatic treatment of cough, new sputum cx from 10/4 only with nl lori    2. acute hypoxic resp failure due to above  - wean O2 as able  - prone a

## 2021-10-08 NOTE — PAYOR COMM NOTE
--------------  CONTINUED STAY REVIEW-----REQUESTING ADDITIONAL DAY 10/8      Payor: [facility]  Subscriber #:  [id number]  Authorization Number: [id number]    Admit date: 9/21/21  Admit time:  5:21 PM    Admitting Physician: Tonio Young MD 105   CO2 34.0*   < > 31.0 29.0 29.0  29.0   ALKPHO 64  --  67  --  69   AST 22  --  23  --  39*   ALT 73*  --  72*  --  110*   BILT 0.4  --  0.4  --  0.5   TP 6.4  --  6.4  --  6.6    < > = values in this interval not displayed.         Estimated Creatin Char Adams MD        Supplementary Documentation:      Quality:  · DVT Prophylaxis: Lovenox  · CODE status: full  · Watt: no  · Central line: no  · If COVID testing is negative, may discontinue isolation: no      Will the patient be referred to [facility] Action Dose Route User    10/8/2021 0910 Given  Oral Debe Borer          Procedures:      Plan:

## 2021-10-08 NOTE — PROGRESS NOTES
[facility]     Hospitalist Progress Note     Li Frank Patient Status:  Inpatient    1965 MRN [id number]   [facility] 4SW-A Attending Ayala Larry MD   Hosp Day # 16 PCP Jerson Villa MD     Chief Complaint: Tra Sales --  0.5   TP 6.4  --  6.4  --  6.6    < > = values in this interval not displayed. Estimated Creatinine Clearance: 173.9 mL/min (A) (based on SCr of 0.48 mg/dL (L)). No results for input(s): PTP, INR in the last 168 hours.          COVID-19 Lab Res isolation: no     Will the patient be referred to TCC on discharge?: no  Estimated date of discharge: tbd  Discharge is dependent on: O2 needs   At this point Mr. Sourav Dyer is expected to be discharge to: home

## 2021-10-08 NOTE — PLAN OF CARE
Received pt up in chair, A&Ox4 on vapotherm. Denies pain. Self proned around 2100, remained prone all night. Weaning O2 as tolerated. Voids via urinal, adequate urine output. Afebrile. VSS. NSR/ST on monitor. See flowsheets. See MAR.  Will continue to monit

## 2021-10-08 NOTE — PROGRESS NOTES
[facility] Pulmonary/Critical Care Progress Note     SUBJECTIVE/Interval history: All events, procedures, notes reviewed. Pt denies worsening sob. While proning, vapotherm 50% at 30 L.  While upright, requires 80 to 100% GFRAA 147 150 143  143   GFRNAA 127 130 124  124   CA 9.3 9.4 9.5  9.5    140 137  137   K 4.2 3.8 3.8  3.8    107 105  105   CO2 31.0 29.0 29.0  29.0     Recent Labs   Lab 10/06/21  0519 10/07/21  0459 10/08/21  0534   RBC 5.07 5.07 5.29   H pneumonia  · COVID pneumonia, unvaccinated  · Possible bacterial superinfection given dense consolidation -10/4 with progression on chest x-ray  · Elevated inflammatory markers, d dimer  · Obesity  · H/o psoriasis not on treatment  · Hx lymphoma, 2015 - re

## 2021-10-08 NOTE — PLAN OF CARE
Received patient this morning A&Ox4 on vapotherm. Titrated to 20L/100% and will attempt 15L HFNC. Patient out of bed for meals and self proning while in bed. Patient updated on plan of care.

## 2021-10-09 PROCEDURE — 99232 SBSQ HOSP IP/OBS MODERATE 35: CPT | Performed by: INTERNAL MEDICINE

## 2021-10-09 RX ORDER — DEXAMETHASONE 4 MG/1
4 TABLET ORAL
Status: DISCONTINUED | OUTPATIENT
Start: 2021-10-09 | End: 2021-10-10

## 2021-10-09 NOTE — PLAN OF CARE
Received pt at 0730. Alert and oriented. 10L high flow. Weaned to 8L. Tolerating well. Uses IS. Pt will desat to low 80's with coughing fits, but recovers quickly. Up to chair for all meals. Self prones. Using urinal at bedside. Bm. Transfer orders.       A

## 2021-10-09 NOTE — PROGRESS NOTES
[facility]     Hospitalist Progress Note     Justin Corral Patient Status:  Inpatient    1965 MRN [id number]   [facility] 4SW-A Attending Miguel Ramos MD   Hosp Day # 25 PCP Nick Tanner MD     Chief Complaint: Edinson Domingo > 29.0 29.0  29.0 30.0   ALKPHO 64  --  67  --   --  69  --    AST 22  --  23  --   --  39*  --    ALT 73*  --  72*  --   --  110*  --    BILT 0.4  --  0.4  --   --  0.5  --    TP 6.4  --  6.4  --   --  6.6  --     < > = values in this interval not display discussed with patient, RN    Luke Velasquez MD      Supplementary Documentation:     Quality:  · DVT Prophylaxis: Lovenox  · CODE status: full  · Watt: no  · Central line: no  · If COVID testing is negative, may discontinue isolation: no     Will the p

## 2021-10-09 NOTE — PROGRESS NOTES
[facility] Pulmonary/Critical Care Progress Note     SUBJECTIVE/Interval history: All events, procedures, notes reviewed. pt feels well. Proning. Had hypoxic episode to 80's with coughing then improved.        Review of S 9.5 9.3    137  137 138   K 3.8 3.8  3.8 4.2    105  105 106   CO2 29.0 29.0  29.0 30.0     Recent Labs   Lab 10/07/21  0459 10/08/21  0534 10/09/21  0422   RBC 5.07 5.29 5.02   HGB 14.3 15.5 14.2   HCT 45.9 46.7 44.7   MCV 90.5 88.3 89.0   MC superinfection given dense consolidation -10/4 with progression on chest x-ray  · Elevated inflammatory markers, d dimer  · Obesity  · H/o psoriasis not on treatment  · Hx lymphoma, 2015 - reports treated with \"lifestyle modification\"  ·     PLAN    · Co

## 2021-10-09 NOTE — PLAN OF CARE
Assumed care at [address] [place] [address]. Patient sitting in chair saturating well at 15 L HFNC. Patient O2 sats dropped to mid 80s while walking around room, recovered quickly once in bed.  Able to wean O2 to 12L HFNC overnight with only 1 instance of desaturation while cough

## 2021-10-09 NOTE — PLAN OF CARE
Received pt at 1815 from ICU. Pt A&Ox4, pleasant. On 10L HF O2 maintaining spO2 >89%. On tele, NSR. VSS. No c/o pain. Pt updated on plan of care, all needs met at this time.

## 2021-10-10 PROCEDURE — 99232 SBSQ HOSP IP/OBS MODERATE 35: CPT | Performed by: INTERNAL MEDICINE

## 2021-10-10 RX ORDER — DEXAMETHASONE 2 MG/1
2 TABLET ORAL
Status: DISCONTINUED | OUTPATIENT
Start: 2021-10-11 | End: 2021-10-12

## 2021-10-10 NOTE — PLAN OF CARE
Problem: Patient/Family Goals  Goal: Patient/Family Short Term Goal  Description: Patient's Short Term Goal:   9/21 ([address]): Able to wean O2, sleep well  9/22 AM: wean O2, manage cough  9/22(NOC): Able to wean O2, relieve cough, sleep well  9/23: Get some hydration with IV or PO as ordered and tolerated  - Evaluate effectiveness of GI medications  - Encourage mobilization and activity  - Obtain nutritional consult as needed  - Establish a toileting routine/schedule  - Consider collaborating with pharmacy to

## 2021-10-10 NOTE — PROGRESS NOTES
Multidisciplinary Discharge Rounds held 10/9/2021. Treatment team members present today include , , Charge Nurse, Nurse, RT, PT and Pharmacy caring for Nazanin Bridges.      Other care providers present:    Mobility Goal:ambualtin

## 2021-10-10 NOTE — PROGRESS NOTES
[facility] Pulmonary/Critical Care Progress Note     SUBJECTIVE/Interval history: All events, procedures, notes reviewed. Denies cp or sob. proning and requiring 7 to 10 L while prone and 15 L while upright.      Review 124 132 123   CA 9.5  9.5 9.3 9.0     137 138 139   K 3.8  3.8 4.2 4.2     105 106 105   CO2 29.0  29.0 30.0 31.0     Recent Labs   Lab 10/08/21  0534 10/09/21  0422 10/10/21  0645   RBC 5.29 5.02 4.73   HGB 15.5 14.2 14.0   HCT 46.7 44.7 43. 3 unvaccinated  · Possible bacterial superinfection given dense consolidation -10/4 with progression on chest x-ray  · Elevated inflammatory markers, d dimer  · Obesity  · H/o psoriasis not on treatment  · Hx lymphoma, 2015 - reports treated with \"lifestyle

## 2021-10-10 NOTE — PROGRESS NOTES
COVID-19 Daily Discharge Readiness-Nursing    O2 Sat at Rest:     %   O2 Sat with Exertion: SPO2% Ambulation on Oxygen: 90  % on Ambulation oxygen flow (liters per minute): 4  liters   Temperature max from last 24 hrs: Temp (24hrs), Av.8 °F (36.6 °C),

## 2021-10-10 NOTE — PLAN OF CARE
COVID-19 Daily Discharge Readiness-Nursing    O2 Sat at Rest:    98 % on 7L (proning)  O2 Sat with Exertion: 85% on 15L  Temperature max from last 24 hrs: Temp (24hrs), Av.7 °F (36.5 °C), Min:97.4 °F (36.3 °C), Max:98.3 °F (36.8 °C)    Inflammatory Mar Assess for changes in respiratory status  - Assess for changes in mentation and behavior  - Position to facilitate oxygenation and minimize respiratory effort  - Oxygen supplementation based on oxygen saturation or ABGs  - Provide Smoking Cessation handout as needed  Outcome: Progressing

## 2021-10-10 NOTE — PROGRESS NOTES
[facility]     Hospitalist Progress Note     Ang Montoya Patient Status:  Inpatient    1965 MRN [id number]   [facility] 4SW-A Attending Garfield Mccabe MD   Hosp Day # 23 PCP Janeth Andrade MD     Chief Complaint: Love Estrada < > = values in this interval not displayed. Estimated Creatinine Clearance: 170.3 mL/min (A) (based on SCr of 0.49 mg/dL (L)). No results for input(s): PTP, INR in the last 168 hours.          COVID-19 Lab Results    COVID-19  Lab Results   Com the patient be referred to TCC on discharge?: no  Estimated date of discharge: tbd  Discharge is dependent on: O2 needs   At this point Mr. Monet Mas is expected to be discharge to: home

## 2021-10-11 PROCEDURE — 99232 SBSQ HOSP IP/OBS MODERATE 35: CPT | Performed by: INTERNAL MEDICINE

## 2021-10-11 NOTE — PROGRESS NOTES
[facility]     Hospitalist Progress Note     Alisia Ospina Patient Status:  Inpatient    1965 MRN [id number]   [facility] 4SW-A Attending Bony Stevens MD   Hosp Day # 21 PCP Denver Mustafa MD     Chief Complaint: Tomasa Brandt hours.         COVID-19 Lab Results    COVID-19  Lab Results   Component Value Date    COVID19 Detected (A) 09/16/2021       Pro-Calcitonin  No results for input(s): PCT in the last 168 hours.     Cardiac  No results for input(s): TROP, PBNP in the last 168 be discharge to: home

## 2021-10-11 NOTE — DIETARY NOTE
[facility]    NUTRITION ASSESSMENT    Pt does not meet malnutrition criteria at this time. NUTRITION INTERVENTION:    1. Meal and Snacks - monitor patient po intake. Encourage adequate po of appropriate diet.   2. Medical Food Supplements -  Ensure 3.2 oz)     NUTRITION:  Diet: Orders Placed This Encounter      Regular/General diet Is Patient on Accuchecks?  No     Oral Supplements: Ensure High Protein daily    Percent Meals Eaten (last 3 days)     Date/Time Percent Meals Eaten (%)    10/09/21 0800 10

## 2021-10-11 NOTE — PROGRESS NOTES
10/11/21 1834   Mobility   O2 walk?  Yes   SPO2% on Room Air at Rest 87   SPO2% on Oxygen at Rest 91   At rest oxygen flow (liters per minute) 6   SPO2% Ambulation on Oxygen 90   Ambulation oxygen flow (liters per minute) 6

## 2021-10-11 NOTE — PROGRESS NOTES
COVID-19 Daily Discharge Readiness-Nursing    O2 Sat at Rest:     %   O2 Sat with Exertion: SPO2% Ambulation on Oxygen: 15  % on Ambulation oxygen flow (liters per minute): 4  liters   Temperature max from last 24 hrs: Temp (24hrs), Av.1 °F (36.7 °C),

## 2021-10-11 NOTE — PROGRESS NOTES
INFECTIOUS DISEASE PROGRESS NOTE    Pool Izquierdo Patient Status:  Inpatient    1965 MRN [id number]   [facility] 5NW-A Attending Santiago Liu MD   Hosp Day # 21 PCP Mendez Bravo MD     Remdesivir no  Dexa  Sp alicia d#14  Sp CXT d Oral, resp. rate 20, height 5' 9\" (1.753 m), weight 249 lb 5.4 oz (113.1 kg), SpO2 92 %. Alert and oriented. No distress. proning  HEENT: no scleral icterus or conjunctival injection. Moist mucous membranes. No thrush  Neck: No lymphadenopathy. Supple. COVID19 Detected (A) 09/16/2021       Pro-Calcitonin  No results for input(s): PCT in the last 168 hours. Cardiac  No results for input(s): TROP, PBNP in the last 168 hours. Creatinine Kinase  No results for input(s): CK in the last 168 hours.     Inf

## 2021-10-11 NOTE — PLAN OF CARE
Problem: Patient/Family Goals  Goal: Patient/Family Short Term Goal  Description: Patient's Short Term Goal:   9/21 ([address]): Able to wean O2, sleep well  9/22 AM: wean O2, manage cough  9/22(NOC): Able to wean O2, relieve cough, sleep well  9/23: Get some Assess bowel function  - Maintain adequate hydration with IV or PO as ordered and tolerated  - Evaluate effectiveness of GI medications  - Encourage mobilization and activity  - Obtain nutritional consult as needed  - Establish a toileting routine/schedule

## 2021-10-11 NOTE — PLAN OF CARE
Multidisciplinary Discharge Rounds held 10/11/2021. Treatment team members present today include , , Charge Nurse, Nurse, RT, PT and Pharmacy caring for Maria Fernanda Somers.      Other care providers present:    Mobility Goal: up in c Cessation handout, if applicable  - Encourage broncho-pulmonary hygiene including cough, deep breathe, Incentive Spirometry  - Assess the need for suctioning and perform as needed  - Assess and instruct to report SOB or any respiratory difficulty  - Respir

## 2021-10-11 NOTE — PROGRESS NOTES
[facility] Pulmonary/Critical Care Progress Note     SUBJECTIVE/Interval history: All events, procedures, notes reviewed. Pt is sleeping.      Review of Systems:   A comprehensive 14 point review of systems was completed 32.0     Recent Labs   Lab 10/09/21  0422 10/10/21  0645 10/11/21  0607   RBC 5.02 4.73 4.74   HGB 14.2 14.0 13.7   HCT 44.7 43.3 43.0   MCV 89.0 91.5 90.7   MCH 28.3 29.6 28.9   MCHC 31.8 32.3 31.9   RDW 12.8 12.9 13.0   NEPRELIM 6.87 5.60 4.65   WBC 9.3 dimer  · Obesity  · H/o psoriasis not on treatment  · Hx lymphoma, 2015 - reports treated with \"lifestyle modification\"  ·     PLAN    · Continuing to monitor respiratory status.  Improved today requiring HFNC 8-10 L while supine  · Remains on steroids da

## 2021-10-11 NOTE — PROGRESS NOTES
10/10/21 1830   Mobility   O2 walk?  Yes   SPO2% on Oxygen at Rest 98   At rest oxygen flow (liters per minute) 7  (proning)   SPO2% Ambulation on Room Air 88   SPO2% Ambulation on Oxygen 15

## 2021-10-11 NOTE — PROGRESS NOTES
Multidisciplinary Discharge Rounds held 10/11/2021. Treatment team members present today include , , Charge Nurse, Nurse, RT, PT and Pharmacy caring for Joenathan Frankel.          Mobility Goal: ambulating    Readmission Risk:

## 2021-10-11 NOTE — PAYOR COMM NOTE
--------------  CONTINUED STAY REVIEW------REQUESTING ADDITIONAL DAYS 10/9 10/10 10/11      Payor: [facility]  Subscriber #:  [id number]  Authorization Number: [id number]    Admit date: 9/21/21  Admit time:  5:21 PM    Admitting Physician: Kya Khan --   --  2.6*  --       < > 137   < > 140 137  137 138   K 4.3   < > 4.2   < > 3.8 3.8  3.8 4.2      < > 104   < > 107 105  105 106   CO2 34.0*   < > 31.0   < > 29.0 29.0  29.0 30.0   ALKPHO 64  --  67  --   --  69  --    AST 22  --  23  --   - baricitinib  ? IV merrem  ? Trend inflammatory markers, downtrending  ? Proning encouraged  · HypoNatremia  · Obesity            Plan of care:    Wean steroids, transfer to PMU later today possibly        Plan of care discussed with patient, KINDRA Pompa Adjutant 16 17 15   CREATSERUM 0.45*   < > 0.48*  0.48* 0.41* 0.49*   GFRAA 147   < > 143  143 153 142   GFRNAA 127   < > 124  124 132 123   CA 9.3   < > 9.5  9.5 9.3 9.0   ALB 2.4*  --  2.6*  --  2.3*      < > 137  137 138 139   K 4.2   < > 3.8  3.8 4.2 4.2 vaccinated  ? Refused Remdesivir and Monoclonal Ab  ? ON steroids, wean   ? Finished baricitinib  ? IV merrem  ? Trend inflammatory markers, downtrending  ? Proning encouraged  · HypoNatremia  · Obesity            Plan of care:    Wean steroids, O2        P 9.5   < > 9.3 9.0 9.1   ALB 2.6*  --   --  2.3* 2.4*     137   < > 138 139 140   K 3.8  3.8   < > 4.2 4.2 3.9     105   < > 106 105 104   CO2 29.0  29.0   < > 30.0 31.0 32.0   ALKPHO 69  --   --  61 66   AST 39*  --   --  33 36   *  -- downtrending  ? Proning encouraged  ? Wean O2 as tolerated  · HypoNatremia  · Obesity            Plan of care:    Wean steroids, O2        Plan of care discussed with patient, RN     Edvin Oates MD        Supplementary Documentation:      Quality:  · D 2100 Given  Oral Rocio Mariscal, KINDRA      Meropenem [facility]) 500 mg in sodium chloride 0.9% 100 mL IVPB-MBP     Date Action Dose Route User    10/11/2021 1502 New Bag  Intravenous Miguel Suarez RN    10/11/2021 5062 New Bag  Intravenous Rocio Mariscal

## 2021-10-11 NOTE — COVID NURSING ASSESSMENT
COVID-19 Daily Discharge Readiness-Nursing    O2 Sat at Rest:     93% on 6L HFNC   O2 Sat with Exertion: 90% on 6L HFNC  Temperature max from last 24 hrs: Temp (24hrs), Av.1 °F (36.7 °C), Min:97.7 °F (36.5 °C), Max:98.3 °F (36.8 °C)    Inflammatory Mar

## 2021-10-12 VITALS
WEIGHT: 249.31 LBS | HEART RATE: 75 BPM | TEMPERATURE: 98 F | DIASTOLIC BLOOD PRESSURE: 72 MMHG | RESPIRATION RATE: 27 BRPM | HEIGHT: 69 IN | SYSTOLIC BLOOD PRESSURE: 117 MMHG | BODY MASS INDEX: 36.93 KG/M2 | OXYGEN SATURATION: 93 %

## 2021-10-12 PROCEDURE — 99239 HOSP IP/OBS DSCHRG MGMT >30: CPT | Performed by: INTERNAL MEDICINE

## 2021-10-12 RX ORDER — FLUVOXAMINE MALEATE 50 MG/1
50 TABLET, COATED ORAL 2 TIMES DAILY
Qty: 26 TABLET | Refills: 0 | Status: SHIPPED | COMMUNITY
Start: 2021-10-12 | End: 2021-10-15 | Stop reason: CLARIF

## 2021-10-12 RX ORDER — ALBUTEROL SULFATE 90 UG/1
8 AEROSOL, METERED RESPIRATORY (INHALATION) EVERY 4 HOURS PRN
Qty: 1 EACH | Refills: 0 | Status: SHIPPED | OUTPATIENT
Start: 2021-10-12

## 2021-10-12 NOTE — DISCHARGE PLANNING
NURSING DISCHARGE NOTE    Discharged Home via Wheelchair. Accompanied by Support staff  Belongings Taken by patient/family. Discharge navigator complete. Discharge instructions reviewed with patient at bedside. Pulse oximeter dispensed.   All questio

## 2021-10-12 NOTE — PROGRESS NOTES
[facility]     Hospitalist Progress Note     Ivy Cummings Patient Status:  Inpatient    1965 MRN [id number]   [facility] 4SW-A Attending Silver Willson MD   Hosp Day # 21 PCP Sheldon Vigil MD     Chief Complaint: Hawa Cruz Labs   Lab 10/06/21  0519 10/06/21  0519 10/07/21  0459 10/08/21  0534 10/09/21  0422   CRP 2.23*  --  0.89* 1.05*  --    MARYSOL 889.7*  --  857.5* 898.4*  --       < > 179 202 191   DDIMER 0.45  --   --  0.61*  --     < > = values in this interval not

## 2021-10-12 NOTE — DISCHARGE SUMMARY
[facility] HOSPITALIST  DISCHARGE SUMMARY     Lexy Luke Patient Status:  Inpatient    1965 MRN [id number]   [facility] 5NW-A Attending Yajaira Peralta MD   [address] Day # 24 PCP Jaquelin Joyner MD     Date of Admission: 2021  Date o · none    Consultants:  • Pulm/ID    Discharge Medication List:     Discharge Medications      START taking these medications      Instructions Prescription details   albuterol 108 (90 Base) MCG/ACT Aers      Inhale 8 puffs into the lungs every 4 (four)

## 2021-10-12 NOTE — CM/SW NOTE
CM noted O2 order for home. CM started referral in aidin with O2 order from Dr Laura Vasquez and last O2 walk. Bedside RN stated that they will do another O2 walk today and then CM will attach it in aidin.      HME will provide home O2 and Rojelio Miles states she will del

## 2021-10-12 NOTE — CM/SW NOTE
10/12/21 1500   Discharge disposition   Expected discharge disposition Home or Self   DME/Infusion Providers [facility]  (Home O2)   Discharge transportation Private car

## 2021-10-12 NOTE — PAYOR COMM NOTE
--------------  CONTINUED STAY REVIEW----REQUESTING ADDITIONAL DAY 10/12    Payor: [address] [employer]  Subscriber #:  [id number]  Authorization Number: [id number]    Admit date: 9/21/21  Admit time:  5:21 PM    Admitting Physician: Isabela Harvey MD results for input(s): TROP, PBNP in the last 168 hours.     Creatinine Kinase  No results for input(s): CK in the last 168 hours.     Inflammatory Markers          Recent Labs   Lab 10/06/21  8858 10/06/21  0519 10/07/21  0459 10/08/21  0534 10/09/21  0429 10/11/2021 2144 Given  Inhalation Rojelio Dunlap RN    10/11/2021 1611 Given  Inhalation Cirilo Novoa, KINDRA      benzonatate (TESSALON) cap 100 mg     Date Action Dose Route User    10/11/2021 2201 Given  Oral Rojelio Dunlap RN      dexamethasone [facility].

## 2021-10-12 NOTE — PROGRESS NOTES
[facility] Pulmonary/Critical Care Progress Note     SUBJECTIVE/Interval history: All events, procedures, notes reviewed. Pt feels well on proning. 3 L at rest and 6 L O2 with acitivty.       Review of Systems:   A compr 105 104 107   CO2 31.0 32.0 29.0     Recent Labs   Lab 10/10/21  0645 10/11/21  0607 10/12/21  0653   RBC 4.73 4.74 5.06   HGB 14.0 13.7 14.6   HCT 43.3 43.0 47.7   MCV 91.5 90.7 94.3   MCH 29.6 28.9 28.9   MCHC 32.3 31.9 30.6*   RDW 12.9 13.0 13.2   NEPRE modification\"  ·     PLAN    · Continuing to monitor respiratory status.  Improved today requiring 3 L O2 at rest and 6 L with activity  · Remains on steroids daily;completed baricitinib -refused remdesivir  · Repeat IL-6 level is within normal range; infl

## 2021-10-12 NOTE — PROGRESS NOTES
[facility] Pharmacy Note:  Discontinuation of Stress Ulcer Prophylaxis     Ang Montoya is a 55year old patient who was initiated on stress ulcer prophylaxis with pantoprazole (PROTONIX). The patient no longer meets criteria for stress ulcer prophylaxis.   I

## 2021-10-12 NOTE — PLAN OF CARE
COVID-19 Daily Discharge Readiness-Nursing    O2 Sat at Rest:  95% on 6L HF  O2 Sat with Exertion: SPO2% Ambulation on Oxygen: 90  % on Ambulation oxygen flow (liters per minute): 6  liters   Temperature max from last 24 hrs: Temp (24hrs), Av °F (36.7 interventions  Outcome: Progressing     Problem: RESPIRATORY - ADULT  Goal: Achieves optimal ventilation and oxygenation  Description: INTERVENTIONS:  - Assess for changes in respiratory status  - Assess for changes in mentation and behavior  - Position to Assess and document skin integrity  - Monitor for areas of redness and/or skin breakdown  - Initiate interventions, skin care algorithm/standards of care as needed  Outcome: Progressing

## 2021-10-12 NOTE — PROGRESS NOTES
10/12/21 1443   Mobility   O2 walk?  Yes   SPO2% on Room Air at Rest 88   SPO2% on Oxygen at Rest 92   At rest oxygen flow (liters per minute) 2   SPO2% Ambulation on Oxygen 91   Ambulation oxygen flow (liters per minute) 4

## 2021-10-13 ENCOUNTER — PATIENT OUTREACH (OUTPATIENT)
Dept: CASE MANAGEMENT | Age: 56
End: 2021-10-13

## 2021-10-13 DIAGNOSIS — Z02.9 ENCOUNTERS FOR UNSPECIFIED ADMINISTRATIVE PURPOSE: ICD-10-CM

## 2021-10-13 DIAGNOSIS — J96.01 ACUTE RESPIRATORY FAILURE WITH HYPOXIA (HCC): ICD-10-CM

## 2021-10-13 PROCEDURE — 1111F DSCHRG MED/CURRENT MED MERGE: CPT

## 2021-10-13 NOTE — PAYOR COMM NOTE
--------------  DISCHARGE REVIEW    Payor: [facility]  Subscriber #:  [id number]  Authorization Number: [id number]    Admit date: 9/21/21  Admit time:   5:21 PM  Discharge Date: 10/12/2021  5:55 PM     Admitting Physician: MD HEIDY Gregorio

## 2021-10-13 NOTE — PROGRESS NOTES
Initial Post Discharge Follow Up   Discharge Date: 10/12/21  Contact Date: 10/13/2021    Consent Verification:  Assessment Completed With: Patient  HIPAA Verified?   Yes    Discharge Dx:     COVID-19 pneumonia  Acute hypoxic resp failure     Was TCC orde 10/13/2021) 30 capsule 0   • Budesonide (PULMICORT FLEXHALER) 180 MCG/ACT Inhalation Aerosol Powder, Breath Activated Inhale 2 puffs into the lungs 2 (two) times daily.  (Patient not taking: Reported on 10/13/2021)       • (NCM)  Were there any medication c II)            [facility] [address]  [place] [address] [phone]          PCP TCM/HFU appointment: scheduled at D/C within 7-14 days  yes     NCM Reviewed/scheduled/rescheduled

## 2021-10-15 ENCOUNTER — OFFICE VISIT (OUTPATIENT)
Dept: INTERNAL MEDICINE CLINIC | Facility: CLINIC | Age: 56
End: 2021-10-15
Payer: COMMERCIAL

## 2021-10-15 VITALS
DIASTOLIC BLOOD PRESSURE: 72 MMHG | WEIGHT: 246 LBS | RESPIRATION RATE: 24 BRPM | HEART RATE: 86 BPM | HEIGHT: 69 IN | OXYGEN SATURATION: 95 % | TEMPERATURE: 97 F | SYSTOLIC BLOOD PRESSURE: 120 MMHG | BODY MASS INDEX: 36.43 KG/M2

## 2021-10-15 DIAGNOSIS — U07.1 PNEUMONIA DUE TO COVID-19 VIRUS: Primary | ICD-10-CM

## 2021-10-15 DIAGNOSIS — R09.02 HYPOXIA: ICD-10-CM

## 2021-10-15 DIAGNOSIS — J12.82 PNEUMONIA DUE TO COVID-19 VIRUS: Primary | ICD-10-CM

## 2021-10-15 DIAGNOSIS — J96.01 ACUTE RESPIRATORY FAILURE WITH HYPOXIA (HCC): ICD-10-CM

## 2021-10-15 PROCEDURE — 99495 TRANSJ CARE MGMT MOD F2F 14D: CPT | Performed by: NURSE PRACTITIONER

## 2021-10-15 PROCEDURE — 3078F DIAST BP <80 MM HG: CPT | Performed by: NURSE PRACTITIONER

## 2021-10-15 PROCEDURE — 85025 COMPLETE CBC W/AUTO DIFF WBC: CPT | Performed by: NURSE PRACTITIONER

## 2021-10-15 PROCEDURE — 3074F SYST BP LT 130 MM HG: CPT | Performed by: NURSE PRACTITIONER

## 2021-10-15 PROCEDURE — 80053 COMPREHEN METABOLIC PANEL: CPT | Performed by: NURSE PRACTITIONER

## 2021-10-15 PROCEDURE — 3008F BODY MASS INDEX DOCD: CPT | Performed by: NURSE PRACTITIONER

## 2021-10-15 RX ORDER — GUAIFENESIN 600 MG
1200 TABLET, EXTENDED RELEASE 12 HR ORAL 2 TIMES DAILY
Qty: 20 TABLET | Refills: 0 | Status: SHIPPED | OUTPATIENT
Start: 2021-10-15

## 2021-10-15 RX ORDER — BENZONATATE 200 MG/1
200 CAPSULE ORAL 3 TIMES DAILY PRN
Qty: 30 CAPSULE | Refills: 0 | Status: SHIPPED | OUTPATIENT
Start: 2021-10-15

## 2021-10-15 NOTE — PROGRESS NOTES
TRANSITIONAL CARE CLINIC PHARMACIST MEDICATION RECONCILIATION        Joyce Mendez MRN [id number]    1965 PCP Adriana Gamino MD       Comments: Medication history completed by the [address] [place] [address] Pharmacist with the patient and spouse (Na understanding.      Thank you,    Milana Hugo, PharmD, 10/15/2021, 12:31 PM  [address] [facility]

## 2021-10-15 NOTE — PROGRESS NOTES
[facility] 6      HISTORY   CHIEF COMPLAINT: HFU-COVID 19 pneumonia, acute respiratory failure, and hypoxia.      HPI: Joenathan Frankel is a 55year old male here today for hospital follow up for COVID 19 pneumonia, ac puffs into the lungs every 4 (four) hours as needed for Wheezing or Shortness of Breath., Disp: 1 each, Rfl: 0  fluvoxaMINE 50 MG Oral Tab, Take 1 tablet (50 mg total) by mouth 2 (two) times daily. FOR 14 DAYS THEN STOP IF BACK TO NORMAL.  AVOID CAFFEINE. ( CREATSERUM 0.44 (L) 10/15/2021 10:24 AM    CA 9.4 10/15/2021 10:24 AM    MG 2.1 10/12/2021 06:53 AM    PHOS 3.4 10/12/2021 06:53 AM    ALB 2.8 (L) 10/15/2021 10:24 AM     (H) 10/15/2021 10:24 AM    AST 41 (H) 10/15/2021 10:24 AM       Immunization H daily  Vitamin D 1,000iu daily  Zinc 220mg daily  IS 5-10 times per hour-goal is 1,000-1,500  Proning 15 minutes every other  · Prescription sent for benzonatate 200 MG Oral Cap;  Take 1 capsule (200 mg total) by mouth 3 (three) times daily as needed for co 20 tablet          Refill:  0      Medications & Refills for this Visit:  benzonatate 200 MG Oral Cap, Take 1 capsule (200 mg total) by mouth 3 (three) times daily as needed for cough. , Disp: 30 capsule, Rfl: 0  guaiFENesin ER (MUCINEX) 600 MG Oral Tablet the last 30 days. The discharge medication list has been reconciled with the patient's current medication list and reviewed by me.   See medication list for additions of new medication, and changes to current doses of medications and discontinued medicatio

## 2021-10-21 ENCOUNTER — APPOINTMENT (OUTPATIENT)
Dept: CT IMAGING | Facility: HOSPITAL | Age: 56
End: 2021-10-21
Attending: EMERGENCY MEDICINE
Payer: COMMERCIAL

## 2021-10-21 ENCOUNTER — HOSPITAL ENCOUNTER (EMERGENCY)
Facility: HOSPITAL | Age: 56
Discharge: HOME | End: 2021-10-21
Attending: EMERGENCY MEDICINE
Payer: COMMERCIAL

## 2021-10-21 ENCOUNTER — TELEPHONE (OUTPATIENT)
Dept: INTERNAL MEDICINE CLINIC | Facility: CLINIC | Age: 56
End: 2021-10-21

## 2021-10-21 VITALS
RESPIRATION RATE: 31 BRPM | TEMPERATURE: 98 F | HEIGHT: 69 IN | HEART RATE: 85 BPM | WEIGHT: 247 LBS | BODY MASS INDEX: 36.58 KG/M2 | SYSTOLIC BLOOD PRESSURE: 128 MMHG | OXYGEN SATURATION: 99 % | DIASTOLIC BLOOD PRESSURE: 86 MMHG

## 2021-10-21 DIAGNOSIS — U07.1 PNEUMONIA DUE TO COVID-19 VIRUS: Primary | ICD-10-CM

## 2021-10-21 DIAGNOSIS — R05.9 COUGH: ICD-10-CM

## 2021-10-21 DIAGNOSIS — J12.82 PNEUMONIA DUE TO COVID-19 VIRUS: Primary | ICD-10-CM

## 2021-10-21 PROCEDURE — 99285 EMERGENCY DEPT VISIT HI MDM: CPT

## 2021-10-21 PROCEDURE — 85025 COMPLETE CBC W/AUTO DIFF WBC: CPT | Performed by: EMERGENCY MEDICINE

## 2021-10-21 PROCEDURE — 80053 COMPREHEN METABOLIC PANEL: CPT | Performed by: EMERGENCY MEDICINE

## 2021-10-21 PROCEDURE — 71275 CT ANGIOGRAPHY CHEST: CPT | Performed by: EMERGENCY MEDICINE

## 2021-10-21 PROCEDURE — 93010 ELECTROCARDIOGRAM REPORT: CPT

## 2021-10-21 PROCEDURE — 83880 ASSAY OF NATRIURETIC PEPTIDE: CPT | Performed by: EMERGENCY MEDICINE

## 2021-10-21 PROCEDURE — 36415 COLL VENOUS BLD VENIPUNCTURE: CPT

## 2021-10-21 PROCEDURE — 93005 ELECTROCARDIOGRAM TRACING: CPT

## 2021-10-21 RX ORDER — ALBUTEROL SULFATE 90 UG/1
2 AEROSOL, METERED RESPIRATORY (INHALATION) EVERY 4 HOURS PRN
Qty: 1 EACH | Refills: 0 | Status: SHIPPED | OUTPATIENT
Start: 2021-10-21 | End: 2021-11-20

## 2021-10-21 NOTE — ED INITIAL ASSESSMENT (HPI)
Pt was in hospital for 21 days with covid. Pt was sent home last week on 3L O2. Pt states started coughing, productive. Pt states yesterday pt had to increased O2 to 6L.

## 2021-10-21 NOTE — TELEPHONE ENCOUNTER
Pt stated he had a coughing episode last night, worse than other nights. Hard to breathing, couldn't catch his breath and cough was uncontrollable - worse at night. Pt stated he's been out of the hospital for 6 days. Pt stated he feels like he's getting worse. Pt added he was on so many medications in the hospital and then taking off of them. Pt has albuterol and a cough medicine. Pt wants to get in sooner to see AD, he's currently scheduled on below. High TE    Future Appointments   Date Time Provider Genesis Correa   11/2/2021 10:20 AM Walter Rojo MD [facility]       I scheduled pt with [address], AD not available for today. Pt stated he feels ok but wants to be checked out.  Please advise     Future Appointments   Date Time Provider Genesis Correa   10/21/2021  4:40 PM Grayson Ott MD [facility]   11/2/2021 10:20 AM Pal De Los Santos MD [facility]

## 2021-10-21 NOTE — ED PROVIDER NOTES
Patient Seen in: [facility] Emergency Department      History   Patient presents with:  Difficulty Breathing    Stated Complaint: SOB    Subjective:   HPI  This is a 16-year-old male who has been recently diagnosed with Covid he was in the hospital respiratory distress. At this present time slightly obese male. The patient is in no respiratory distress. The patient is not septic or toxic    HEENT: Atraumatic, conjunctiva are not pale. There is no icterus. Oral mucosa Is wet. No facial trauma.   Alex Garcia is no acute ST elevations or ischemic findings. The rest of the EKG including rate rhythm axis and intervals I agree with the EKG report . The rate is 84 there is PACs noted incomplete right bundle branch block. .  When compared to an old EKG there is no s upper lobe and lingula and more reticular bandlike opacities in the subpleural location of the right middle lobe and right lower lobe. Findings are more likely related to moderate to severe burden of chronic Covid changes with interstitial scarring.  MEDIA 108 (90 Base) MCG/ACT Inhalation Aero Soln  Inhale 2 puffs into the lungs every 4 (four) hours as needed for Wheezing. Qty: 1 each Refills: 0    !! - Potential duplicate medications found. Please discuss with provider.

## 2021-10-21 NOTE — TELEPHONE ENCOUNTER
Patient calling today with worsening of symptoms over the last few days. Patient was discharged from [place] [address] 10/12/2021 after 21 day hospital stay. Patient states he does not have follow up with pulm- he wasn't sure this was a requirement. Patient states cough is worsening leading to coughing fits where he cannot catch his breath. increasing demand for oxygen over last few days previously was down to 2.5 liters at baseline now at 4 L minimum. Worsening chest congestion he said last night was \"scary\". Patient advised given extent of recent hospitalization needing to be seen back in ER for evaluation and when discharged needing HFU with pulm along with our office. Patient agreeable and states he will be seen in ER today- FYI AS/AD.

## 2021-11-02 ENCOUNTER — PATIENT OUTREACH (OUTPATIENT)
Dept: CASE MANAGEMENT | Age: 56
End: 2021-11-02

## 2021-11-02 ENCOUNTER — TELEPHONE (OUTPATIENT)
Dept: INTERNAL MEDICINE CLINIC | Facility: CLINIC | Age: 56
End: 2021-11-02

## 2021-11-02 ENCOUNTER — TELEMEDICINE (OUTPATIENT)
Dept: INTERNAL MEDICINE CLINIC | Facility: CLINIC | Age: 56
End: 2021-11-02
Payer: COMMERCIAL

## 2021-11-02 DIAGNOSIS — U07.1 PNEUMONIA DUE TO COVID-19 VIRUS: Primary | ICD-10-CM

## 2021-11-02 DIAGNOSIS — J96.01 ACUTE HYPOXEMIC RESPIRATORY FAILURE (HCC): ICD-10-CM

## 2021-11-02 DIAGNOSIS — J12.82 PNEUMONIA DUE TO COVID-19 VIRUS: Primary | ICD-10-CM

## 2021-11-02 PROCEDURE — 99213 OFFICE O/P EST LOW 20 MIN: CPT | Performed by: INTERNAL MEDICINE

## 2021-11-02 RX ORDER — ALBUTEROL SULFATE 90 UG/1
2 AEROSOL, METERED RESPIRATORY (INHALATION) EVERY 6 HOURS PRN
Qty: 1 EACH | Refills: 1 | Status: SHIPPED | OUTPATIENT
Start: 2021-11-02

## 2021-11-02 RX ORDER — BENZONATATE 100 MG/1
100 CAPSULE ORAL 3 TIMES DAILY PRN
Qty: 21 CAPSULE | Refills: 0 | Status: SHIPPED | OUTPATIENT
Start: 2021-11-02 | End: 2021-11-09

## 2021-11-02 NOTE — TELEPHONE ENCOUNTER
Pt calling on a medical exemption form for AD to complete. I advised pt that we would call him back when it's ready. Pt added he didn't want it faxed any longer that he wants to review it first.  Please contact pt on his cell phone once it's completed.   Pt

## 2021-11-02 NOTE — PROGRESS NOTES
Chart reviewed. Patient had follow-up with PCP today. Patient was advised to follow-up with pulmonology. Patient reports improvement in symptoms and decreasing O2 needs. No needs for NCM to address. Will follow-up.

## 2021-11-02 NOTE — PROGRESS NOTES
Earnstine Chyle  9/2/1965    No chief complaint on file. Video encounter    SUBJECTIVE   Earnstine Chyle is a 55year old male who presents as a hospital follow-up.     The patient experienced symptoms suggestive of COVID-19 infection on 9/12 and later diagn for Wheezing. 1 each 0   • albuterol 108 (90 Base) MCG/ACT Inhalation Aero Soln Inhale 2 puffs into the lungs every 4 (four) hours as needed for Wheezing.  1 each 0   • benzonatate 200 MG Oral Cap Take 1 capsule (200 mg total) by mouth 3 (three) times daily improved, he is eligible. Continue supplemental oxygen via nasal cannula: Currently requiring 1.5 to 2 L to maintain SPO2 in the mid to upper 90s.   Advised follow-up with pulmonary service  Continue Tessalon Perles and other supportive treatment  Contact

## 2021-11-03 ENCOUNTER — TELEPHONE (OUTPATIENT)
Dept: INTERNAL MEDICINE CLINIC | Facility: CLINIC | Age: 56
End: 2021-11-03

## 2021-11-03 DIAGNOSIS — J12.82 PNEUMONIA DUE TO COVID-19 VIRUS: Primary | ICD-10-CM

## 2021-11-03 DIAGNOSIS — U07.1 PNEUMONIA DUE TO COVID-19 VIRUS: Primary | ICD-10-CM

## 2021-11-03 NOTE — TELEPHONE ENCOUNTER
Spoke with pt. Informed him of the CXR order and provided central scheduling number. Informed him of the importance to follow up with pulm as soon as possible. Pt said the soonest he could get in is 11/29 (appointment is made). Zafar Stanford he is calling every morning to see if there is a cancellation to try and get in sooner. Routing to provider for FYI.

## 2021-11-03 NOTE — TELEPHONE ENCOUNTER
Patient advised on several occasions, but providing staff at time of hospital discharge and by our nursing staff, to follow-up with the pulmonary service. Please advise this follow-up. He needs to do this ASAP. CXR ordered.

## 2021-11-03 NOTE — TELEPHONE ENCOUNTER
Pt stated he has a virtual appt yesterday with AD and he stated he feels as if he needs to have a chest xray. He still coughing pretty bad.  Please advise

## 2021-11-04 ENCOUNTER — HOSPITAL ENCOUNTER (OUTPATIENT)
Dept: GENERAL RADIOLOGY | Age: 56
Discharge: HOME | End: 2021-11-04
Attending: INTERNAL MEDICINE
Payer: COMMERCIAL

## 2021-11-04 DIAGNOSIS — U07.1 PNEUMONIA DUE TO COVID-19 VIRUS: ICD-10-CM

## 2021-11-04 DIAGNOSIS — J12.82 PNEUMONIA DUE TO COVID-19 VIRUS: ICD-10-CM

## 2021-11-04 PROCEDURE — 71046 X-RAY EXAM CHEST 2 VIEWS: CPT | Performed by: INTERNAL MEDICINE

## 2021-11-05 ENCOUNTER — TELEPHONE (OUTPATIENT)
Dept: INTERNAL MEDICINE CLINIC | Facility: CLINIC | Age: 56
End: 2021-11-05

## 2021-11-09 RX ORDER — AMOXICILLIN AND CLAVULANATE POTASSIUM 875; 125 MG/1; MG/1
1 TABLET, FILM COATED ORAL 2 TIMES DAILY
Qty: 20 TABLET | Refills: 0 | Status: SHIPPED | OUTPATIENT
Start: 2021-11-09 | End: 2021-11-19

## 2021-11-10 NOTE — TELEPHONE ENCOUNTER
Amoxicillin ordered. Given the previous improvement on CXR and current improved supplemental O2 requirement I do not feel repeat CXR at this time would provide much benefit. If symptoms persist or worsen the patient needs to let me know.  Thanks
LMTCB 11/10/2021
PCP updated
Patient had returned our call. Patient notified of medication sent to pharmacy. Patient advised if symptoms do not improve, or worsen to call our office for condition update to AD.     Patient has pulm appointment at end of month and advised to continue
Patient notified chest xray results, per AD, progressing in the right direction. Pt notified to monitor symptoms and call back on Monday with update. Pt verbalizes understanding. Patient would also like AD to look at the CT from 10/21/21 as well.   [place]
Patient received his Chest X-ray results. Patient still has concerns because he is still coughing a lot and wants to make sure that he is progressing in the right direction.     Please advise
Patient states daughter has cold, so patient now developed congestion. Feeling ok overall. Patient states he is scheduled with pulm 11/29 related to his own schedule and pulm scheduling.  Patient has been able to titrate down to 1 L NC at rest.     Patient
This comment from the radiology service is in comparison from prior studies during hospitalization. In light of the objective improvement I do not recommend change in management at this time.  Please obtain an update; if persistent will either facilitate ex
Statement Selected

## 2021-11-17 NOTE — OCCUPATIONAL THERAPY NOTE
-- DO NOT REPLY / DO NOT REPLY ALL --  -- Message is from the [facility]--    General Patient Message      Reason for Call: Melissa from [employer] called stated patient has a mammogram screening scheduled for 11/19/21 at 4 pm is asking to put referral in system  (EPIC) patient has HMO insurance     Caller Information       Type Contact Phone    11/17/2021 05:09 PM CST Phone (Incoming) MELISSA (Other) [phone]     [employer]           Alternative phone number: none        Did the caller agree that this message can wait until the office reopens in the morning? YES - The Message Can Wait      Send a message to the provider’s clinical support pool.     Turnaround time given to caller:   \"This message will be sent to [state Provider's name]. The clinical team will fulfill your request as soon as they review your message when the office opens tomorrow.\"         Received OT order on 9/28 through functional mobility screen. Per PT note on 10/6, \"Pt being followed peripherally and unable to participate in skilled therapy due to tenuous respiratory status and poor activity tolerance.  Discussed with RN this morning t

## 2022-03-08 ENCOUNTER — TELEPHONE (OUTPATIENT)
Dept: INTERNAL MEDICINE CLINIC | Facility: CLINIC | Age: 57
End: 2022-03-08

## 2022-03-08 NOTE — TELEPHONE ENCOUNTER
CPE due. Call to schedule. Has been seeing Candice Cole most recently. Has been referred for colonoscopy a few times. Please give him info again and ask him to call to schedule.

## 2022-03-14 NOTE — TELEPHONE ENCOUNTER
TC to pt to schedule cpe with AD. Patient states he is not in a place where he can schedule the appt and will call back at the end of the week to make the appt.

## 2022-05-09 ENCOUNTER — TELEPHONE (OUTPATIENT)
Dept: INTERNAL MEDICINE CLINIC | Facility: CLINIC | Age: 57
End: 2022-05-09

## 2022-05-09 NOTE — TELEPHONE ENCOUNTER
Future Appointments   Date Time Provider Genesis Correa   5/26/2022 10:20 AM Ralph Hall MD [facility]     Orders to edward- Pt informed that labs need to be completed no sooner than 2 weeks prior to the appt.  Pt aware to fast-no call back required

## 2022-05-09 NOTE — TELEPHONE ENCOUNTER
TC to pt to schedule cpe with AD. Pt states he is on a conference call and will call back today to schedule cpe.

## 2022-05-10 NOTE — TELEPHONE ENCOUNTER
Future Appointments   Date Time Provider Genesis Correa   5/26/2022 10:20 AM Stacey Alfaro MD [facility]

## 2022-05-26 ENCOUNTER — OFFICE VISIT (OUTPATIENT)
Dept: INTERNAL MEDICINE CLINIC | Facility: CLINIC | Age: 57
End: 2022-05-26
Payer: COMMERCIAL

## 2022-05-26 VITALS
SYSTOLIC BLOOD PRESSURE: 130 MMHG | WEIGHT: 278 LBS | DIASTOLIC BLOOD PRESSURE: 76 MMHG | HEIGHT: 69 IN | RESPIRATION RATE: 18 BRPM | HEART RATE: 90 BPM | BODY MASS INDEX: 41.18 KG/M2 | TEMPERATURE: 98 F | OXYGEN SATURATION: 98 %

## 2022-05-26 DIAGNOSIS — L40.9 PSORIASIS: ICD-10-CM

## 2022-05-26 DIAGNOSIS — R22.0 SWELLING, MASS, OR LUMP ON FACE: ICD-10-CM

## 2022-05-26 DIAGNOSIS — N52.9 ERECTILE DYSFUNCTION, UNSPECIFIED ERECTILE DYSFUNCTION TYPE: ICD-10-CM

## 2022-05-26 DIAGNOSIS — Z12.11 SCREEN FOR COLON CANCER: ICD-10-CM

## 2022-05-26 DIAGNOSIS — E66.9 CLASS 3 OBESITY: ICD-10-CM

## 2022-05-26 DIAGNOSIS — Z00.00 ROUTINE GENERAL MEDICAL EXAMINATION AT A HEALTH CARE FACILITY: Primary | ICD-10-CM

## 2022-05-26 PROCEDURE — 3075F SYST BP GE 130 - 139MM HG: CPT | Performed by: INTERNAL MEDICINE

## 2022-05-26 PROCEDURE — 3008F BODY MASS INDEX DOCD: CPT | Performed by: INTERNAL MEDICINE

## 2022-05-26 PROCEDURE — 99396 PREV VISIT EST AGE 40-64: CPT | Performed by: INTERNAL MEDICINE

## 2022-05-26 PROCEDURE — 3078F DIAST BP <80 MM HG: CPT | Performed by: INTERNAL MEDICINE

## 2022-05-26 RX ORDER — SILDENAFIL 50 MG/1
50 TABLET, FILM COATED ORAL
Qty: 30 TABLET | Refills: 0 | Status: SHIPPED | OUTPATIENT
Start: 2022-05-26

## 2022-05-27 ENCOUNTER — TELEPHONE (OUTPATIENT)
Dept: INTERNAL MEDICINE CLINIC | Facility: CLINIC | Age: 57
End: 2022-05-27

## 2022-05-27 NOTE — TELEPHONE ENCOUNTER
Pharmacy called and asked for verbal of script sent yesterday PA denied. [facility] has 30 tablets for $40. Verbal given to pharmacy. Sent to AD for FYI - patient seen yesterday.

## 2022-07-02 ENCOUNTER — HOSPITAL ENCOUNTER (OUTPATIENT)
Dept: ULTRASOUND IMAGING | Age: 57
Discharge: HOME | End: 2022-07-02
Attending: INTERNAL MEDICINE
Payer: COMMERCIAL

## 2022-07-02 DIAGNOSIS — R22.0 SWELLING, MASS, OR LUMP ON FACE: ICD-10-CM

## 2022-07-02 PROCEDURE — 76536 US EXAM OF HEAD AND NECK: CPT | Performed by: INTERNAL MEDICINE

## 2022-11-22 ENCOUNTER — TELEPHONE (OUTPATIENT)
Dept: INTERNAL MEDICINE CLINIC | Facility: CLINIC | Age: 57
End: 2022-11-22

## 2022-11-22 RX ORDER — CODEINE PHOSPHATE AND GUAIFENESIN 10; 100 MG/5ML; MG/5ML
5 SOLUTION ORAL NIGHTLY PRN
Qty: 70 ML | Refills: 0 | Status: SHIPPED | OUTPATIENT
Start: 2022-11-22

## 2022-11-22 RX ORDER — BENZONATATE 100 MG/1
100 CAPSULE ORAL 3 TIMES DAILY PRN
Qty: 21 CAPSULE | Refills: 0 | Status: SHIPPED | OUTPATIENT
Start: 2022-11-22 | End: 2022-11-29

## 2022-11-22 NOTE — TELEPHONE ENCOUNTER
Pt went to [address] [place] [address] 21 on Sunday for cough/chest congestion/headache-covid test negative-pt calling asking if he can get rx for cough meds at night as he cannot sleep with the bad cough?

## 2022-11-22 NOTE — TELEPHONE ENCOUNTER
Called and left detailed message informing pt cough meds sent. Advised to make f/u appt if no improvement. Call with any further questions.

## 2023-05-25 ENCOUNTER — OFFICE VISIT (OUTPATIENT)
Dept: INTERNAL MEDICINE CLINIC | Facility: CLINIC | Age: 58
End: 2023-05-25
Payer: COMMERCIAL

## 2023-05-25 VITALS
OXYGEN SATURATION: 99 % | SYSTOLIC BLOOD PRESSURE: 126 MMHG | WEIGHT: 283.38 LBS | HEIGHT: 70 IN | BODY MASS INDEX: 40.57 KG/M2 | HEART RATE: 78 BPM | RESPIRATION RATE: 18 BRPM | TEMPERATURE: 98 F | DIASTOLIC BLOOD PRESSURE: 84 MMHG

## 2023-05-25 DIAGNOSIS — R05.1 ACUTE COUGH: Primary | ICD-10-CM

## 2023-05-25 DIAGNOSIS — R09.89 CHEST CONGESTION: ICD-10-CM

## 2023-05-25 PROCEDURE — 3074F SYST BP LT 130 MM HG: CPT | Performed by: NURSE PRACTITIONER

## 2023-05-25 PROCEDURE — 99213 OFFICE O/P EST LOW 20 MIN: CPT | Performed by: NURSE PRACTITIONER

## 2023-05-25 PROCEDURE — 3079F DIAST BP 80-89 MM HG: CPT | Performed by: NURSE PRACTITIONER

## 2023-05-25 PROCEDURE — 3008F BODY MASS INDEX DOCD: CPT | Performed by: NURSE PRACTITIONER

## 2023-05-25 RX ORDER — CODEINE PHOSPHATE AND GUAIFENESIN 10; 100 MG/5ML; MG/5ML
10 SOLUTION ORAL NIGHTLY PRN
Qty: 240 ML | Refills: 0 | Status: SHIPPED | OUTPATIENT
Start: 2023-05-25

## 2023-05-25 RX ORDER — AMOXICILLIN AND CLAVULANATE POTASSIUM 875; 125 MG/1; MG/1
1 TABLET, FILM COATED ORAL 2 TIMES DAILY
Qty: 14 TABLET | Refills: 0 | Status: SHIPPED | OUTPATIENT
Start: 2023-05-25 | End: 2023-06-01

## 2023-12-14 DIAGNOSIS — D31.61: Primary | ICD-10-CM

## 2023-12-20 ENCOUNTER — HOSPITAL ENCOUNTER (OUTPATIENT)
Dept: CT IMAGING | Age: 58
Discharge: HOME | End: 2023-12-20
Attending: OPHTHALMOLOGY

## 2023-12-20 DIAGNOSIS — D31.61: ICD-10-CM

## 2023-12-20 PROCEDURE — 10002805 HB CONTRAST AGENT: Performed by: OPHTHALMOLOGY

## 2023-12-20 PROCEDURE — 10002807 HB RX 258: Performed by: OPHTHALMOLOGY

## 2023-12-20 PROCEDURE — 70481 CT ORBIT/EAR/FOSSA W/DYE: CPT

## 2023-12-20 PROCEDURE — 10004651 HB RX, NO CHARGE ITEM: Performed by: OPHTHALMOLOGY

## 2023-12-20 PROCEDURE — 70481 CT ORBIT/EAR/FOSSA W/DYE: CPT | Performed by: SURGERY

## 2023-12-20 RX ORDER — 0.9 % SODIUM CHLORIDE 0.9 %
10 VIAL (ML) INJECTION ONCE
Status: COMPLETED | OUTPATIENT
Start: 2023-12-20 | End: 2023-12-20

## 2023-12-20 RX ADMIN — SODIUM CHLORIDE 100 ML: 9 INJECTION, SOLUTION INTRAVENOUS at 15:14

## 2023-12-20 RX ADMIN — IOHEXOL 100 ML: 300 INJECTION, SOLUTION INTRAVENOUS at 15:13

## 2023-12-20 RX ADMIN — SODIUM CHLORIDE 10 ML: 9 INJECTION, SOLUTION INTRAMUSCULAR; INTRAVENOUS; SUBCUTANEOUS at 15:14

## 2024-03-08 ENCOUNTER — NURSE TRIAGE (OUTPATIENT)
Dept: SCHEDULING | Age: 59
End: 2024-03-08

## 2024-03-10 PROBLEM — J12.82 PNEUMONIA DUE TO COVID-19 VIRUS: Status: ACTIVE | Noted: 2021-09-21

## 2024-03-10 PROBLEM — N52.9 ERECTILE DYSFUNCTION: Status: ACTIVE | Noted: 2018-04-16

## 2024-03-10 PROBLEM — U07.1 PNEUMONIA DUE TO COVID-19 VIRUS: Status: ACTIVE | Noted: 2021-09-21

## 2024-06-24 ENCOUNTER — APPOINTMENT (OUTPATIENT)
Dept: FAMILY MEDICINE | Age: 59
End: 2024-06-24

## 2024-06-24 VITALS
SYSTOLIC BLOOD PRESSURE: 126 MMHG | WEIGHT: 238 LBS | HEIGHT: 70 IN | RESPIRATION RATE: 16 BRPM | OXYGEN SATURATION: 97 % | BODY MASS INDEX: 34.07 KG/M2 | DIASTOLIC BLOOD PRESSURE: 80 MMHG | HEART RATE: 85 BPM | TEMPERATURE: 97.3 F

## 2024-06-24 DIAGNOSIS — L40.9 PSORIASIS: ICD-10-CM

## 2024-06-24 DIAGNOSIS — C83.30 DIFFUSE LARGE B-CELL LYMPHOMA, UNSPECIFIED BODY REGION  (CMD): ICD-10-CM

## 2024-06-24 DIAGNOSIS — D70.1 AGRANULOCYTOSIS SECONDARY TO CANCER CHEMOTHERAPY (CMD): ICD-10-CM

## 2024-06-24 DIAGNOSIS — T45.1X5A AGRANULOCYTOSIS SECONDARY TO CANCER CHEMOTHERAPY (CMD): ICD-10-CM

## 2024-06-24 DIAGNOSIS — Z76.89 ENCOUNTER TO ESTABLISH CARE: Primary | ICD-10-CM

## 2024-06-24 DIAGNOSIS — R59.0 LAD (LYMPHADENOPATHY), INGUINAL: ICD-10-CM

## 2024-06-24 DIAGNOSIS — H61.23 BILATERAL IMPACTED CERUMEN: ICD-10-CM

## 2024-06-24 DIAGNOSIS — J96.01 ACUTE RESPIRATORY FAILURE WITH HYPOXIA  (CMD): ICD-10-CM

## 2024-06-24 PROBLEM — C83.33 DIFFUSE LARGE B-CELL LYMPHOMA OF INTRA-ABDOMINAL LYMPH NODES  (CMD): Status: ACTIVE | Noted: 2024-03-21

## 2024-06-24 PROCEDURE — 99204 OFFICE O/P NEW MOD 45 MIN: CPT | Performed by: FAMILY MEDICINE

## 2024-06-24 PROCEDURE — 69210 REMOVE IMPACTED EAR WAX UNI: CPT | Performed by: FAMILY MEDICINE

## 2024-06-24 RX ORDER — SULFAMETHOXAZOLE AND TRIMETHOPRIM 800; 160 MG/1; MG/1
1 TABLET ORAL
COMMUNITY
Start: 2024-04-15 | End: 2024-07-05

## 2024-06-24 RX ORDER — DOXYCYCLINE HYCLATE 100 MG/1
100 CAPSULE ORAL 2 TIMES DAILY
COMMUNITY
Start: 2024-01-17

## 2024-06-24 RX ORDER — METRONIDAZOLE 500 MG/1
500 TABLET ORAL 2 TIMES DAILY
COMMUNITY
Start: 2024-01-17

## 2024-06-24 RX ORDER — ALLOPURINOL 300 MG/1
300 TABLET ORAL DAILY
COMMUNITY
Start: 2024-03-21 | End: 2025-03-21

## 2024-06-24 RX ORDER — OXYCODONE HYDROCHLORIDE 5 MG/1
5 TABLET ORAL EVERY 6 HOURS PRN
COMMUNITY
Start: 2024-06-19 | End: 2024-07-19

## 2024-06-24 RX ORDER — ACYCLOVIR 800 MG/1
800 TABLET ORAL
COMMUNITY
Start: 2024-04-01 | End: 2024-09-28

## 2024-06-24 RX ORDER — CEPHALEXIN 250 MG/1
250 CAPSULE ORAL 4 TIMES DAILY
COMMUNITY
Start: 2024-01-17

## 2024-06-24 RX ORDER — CIPROFLOXACIN 500 MG/1
500 TABLET, FILM COATED ORAL 2 TIMES DAILY
COMMUNITY
Start: 2024-01-17

## 2024-06-24 RX ORDER — PREDNISONE 50 MG/1
100 TABLET ORAL
COMMUNITY

## 2024-06-24 ASSESSMENT — PATIENT HEALTH QUESTIONNAIRE - PHQ9
SUM OF ALL RESPONSES TO PHQ9 QUESTIONS 1 AND 2: 0
1. LITTLE INTEREST OR PLEASURE IN DOING THINGS: NOT AT ALL
2. FEELING DOWN, DEPRESSED OR HOPELESS: NOT AT ALL
SUM OF ALL RESPONSES TO PHQ9 QUESTIONS 1 AND 2: 0
CLINICAL INTERPRETATION OF PHQ2 SCORE: NO FURTHER SCREENING NEEDED

## 2024-06-24 ASSESSMENT — ENCOUNTER SYMPTOMS
EYE DISCHARGE: 0
BRUISES/BLEEDS EASILY: 0
DIZZINESS: 0
BLURRED VISION: 0
COUGH: 0
DIAPHORESIS: 0
CONSTIPATION: 0
TREMORS: 0
WEAKNESS: 0
EYE REDNESS: 0
CHILLS: 0
SORE THROAT: 0
SPUTUM PRODUCTION: 0
ORTHOPNEA: 0
FEVER: 0
DEPRESSION: 0
VOMITING: 0
BACK PAIN: 0
SINUS PAIN: 0
TINGLING: 0
EYE PAIN: 0
BLOOD IN STOOL: 0
DIARRHEA: 0
WEIGHT LOSS: 0
INSOMNIA: 0
ABDOMINAL PAIN: 0
SENSORY CHANGE: 0
NERVOUS/ANXIOUS: 0
HEMOPTYSIS: 0
HEADACHES: 0
NAUSEA: 0
SPEECH CHANGE: 0
POLYDIPSIA: 0
WHEEZING: 0
FOCAL WEAKNESS: 0
SHORTNESS OF BREATH: 0

## 2024-11-14 SDOH — ECONOMIC STABILITY: FOOD INSECURITY: WITHIN THE PAST 12 MONTHS, THE FOOD YOU BOUGHT JUST DIDN'T LAST AND YOU DIDN'T HAVE MONEY TO GET MORE.: PATIENT DECLINED

## 2024-11-14 SDOH — ECONOMIC STABILITY: GENERAL: WOULD YOU LIKE HELP WITH ANY OF THE FOLLOWING NEEDS?: I DON'T WANT HELP WITH ANY OF THESE

## 2024-11-14 SDOH — ECONOMIC STABILITY: HOUSING INSECURITY: DO YOU HAVE PROBLEMS WITH ANY OF THE FOLLOWING?: NONE OF THE ABOVE;PATIENT DECLINED

## 2024-11-14 SDOH — ECONOMIC STABILITY: TRANSPORTATION INSECURITY
IN THE PAST 12 MONTHS, HAS LACK OF RELIABLE TRANSPORTATION KEPT YOU FROM MEDICAL APPOINTMENTS, MEETINGS, WORK OR FROM GETTING THINGS NEEDED FOR DAILY LIVING?: PATIENT DECLINED

## 2024-11-14 SDOH — ECONOMIC STABILITY: HOUSING INSECURITY: WHAT IS YOUR LIVING SITUATION TODAY?: PATIENT DECLINED

## 2024-11-14 ASSESSMENT — SOCIAL DETERMINANTS OF HEALTH (SDOH)
IN THE PAST 12 MONTHS, HAS THE ELECTRIC, GAS, OIL, OR WATER COMPANY THREATENED TO SHUT OFF SERVICE IN YOUR HOME?: PATIENT DECLINED

## 2024-11-15 ENCOUNTER — OFFICE VISIT (OUTPATIENT)
Dept: FAMILY MEDICINE | Age: 59
End: 2024-11-15

## 2024-11-15 VITALS
OXYGEN SATURATION: 97 % | WEIGHT: 193 LBS | DIASTOLIC BLOOD PRESSURE: 74 MMHG | HEART RATE: 68 BPM | RESPIRATION RATE: 16 BRPM | TEMPERATURE: 97.8 F | SYSTOLIC BLOOD PRESSURE: 112 MMHG | HEIGHT: 70 IN | BODY MASS INDEX: 27.63 KG/M2

## 2024-11-15 DIAGNOSIS — R59.0 LAD (LYMPHADENOPATHY), INGUINAL: ICD-10-CM

## 2024-11-15 DIAGNOSIS — C83.30 DIFFUSE LARGE B-CELL LYMPHOMA, UNSPECIFIED BODY REGION  (CMD): Primary | ICD-10-CM

## 2024-11-15 DIAGNOSIS — D70.1 AGRANULOCYTOSIS SECONDARY TO CANCER CHEMOTHERAPY (CMD): ICD-10-CM

## 2024-11-15 DIAGNOSIS — G62.9 PERIPHERAL POLYNEUROPATHY: ICD-10-CM

## 2024-11-15 DIAGNOSIS — H61.21 IMPACTED CERUMEN OF RIGHT EAR: ICD-10-CM

## 2024-11-15 DIAGNOSIS — T45.1X5A AGRANULOCYTOSIS SECONDARY TO CANCER CHEMOTHERAPY (CMD): ICD-10-CM

## 2024-11-15 RX ORDER — RIBOFLAVIN (VITAMIN B2) 100 MG
TABLET ORAL
COMMUNITY

## 2024-11-15 RX ORDER — OMEGA-3/DHA/EPA/FISH OIL 300-1000MG
1 CAPSULE ORAL DAILY PRN
COMMUNITY

## 2024-11-15 ASSESSMENT — ENCOUNTER SYMPTOMS
FEVER: 0
ABDOMINAL PAIN: 0
WEAKNESS: 0
SENSORY CHANGE: 0
SHORTNESS OF BREATH: 0
CHILLS: 0
BLURRED VISION: 0
FOCAL WEAKNESS: 0
TREMORS: 0
DEPRESSION: 0
HEADACHES: 0
DIZZINESS: 0
BLOOD IN STOOL: 0
DIARRHEA: 0
POLYDIPSIA: 0
NAUSEA: 0
SPUTUM PRODUCTION: 0
BRUISES/BLEEDS EASILY: 0
SINUS PAIN: 0
EYE DISCHARGE: 0
SPEECH CHANGE: 0
WHEEZING: 0
COUGH: 0
SORE THROAT: 0
EYE PAIN: 0
EYE REDNESS: 0
WEIGHT LOSS: 0
VOMITING: 0
NERVOUS/ANXIOUS: 0
CONSTIPATION: 0
ORTHOPNEA: 0
BACK PAIN: 0
INSOMNIA: 0
HEMOPTYSIS: 0
DIAPHORESIS: 0
TINGLING: 0

## 2025-01-23 ENCOUNTER — TELEPHONE (OUTPATIENT)
Dept: FAMILY MEDICINE | Age: 60
End: 2025-01-23

## (undated) DIAGNOSIS — Z13.228 SCREENING FOR METABOLIC DISORDER: ICD-10-CM

## (undated) DIAGNOSIS — Z12.5 SCREENING PSA (PROSTATE SPECIFIC ANTIGEN): ICD-10-CM

## (undated) DIAGNOSIS — Z00.00 ROUTINE GENERAL MEDICAL EXAMINATION AT A HEALTH CARE FACILITY: Primary | ICD-10-CM

## (undated) DIAGNOSIS — Z13.0 SCREENING FOR BLOOD DISEASE: ICD-10-CM

## (undated) DIAGNOSIS — Z13.29 SCREENING FOR THYROID DISORDER: ICD-10-CM

## (undated) DIAGNOSIS — Z13.220 SCREENING, LIPID: ICD-10-CM

## (undated) NOTE — LETTER
Date: 10/15/2021    Patient Name: Lucia Delacruz          To Whom it may concern: The above patient was seen at the [facility] for treatment of a medical condition.     This patient should be excused from attending work from 09/16/2021 thro

## (undated) NOTE — LETTER
Your patient was recently seen at the [facility] for a hospital follow-up visit. The visit note is attached. Please contact the clinic with any questions at [phone].     Thank you,  ALEKSANDER Yadav

## (undated) NOTE — ED AVS SNAPSHOT
[facility] Emergency Department in [address] [place] [address]    Phone:  [phone]    Fax:  [phone]           Joyce Mendez   MRN: [id number]    Department:  [facility] Emergency Department in [place]   Date of Visit: IF THERE IS ANY CHANGE OR WORSENING OF YOUR CONDITION, CALL YOUR PRIMARY CARE PHYSICIAN AT ONCE OR RETURN IMMEDIATELY TO THE EMERGENCY DEPARTMENT.     If you have been prescribed any medication(s), please fill your prescription right away and begin taking t

## (undated) NOTE — LETTER
April 1, 2017    Patient: Jovanni Martin   Date of Visit: 4/1/2017       To Whom It May Concern:    Jovanni Martin was seen and treated in our emergency department on 4/1/2017. He should not return to work until 4/5/17.     If you have any questions or

## (undated) NOTE — ED AVS SNAPSHOT
THE [facility] Emergency Department in [address] [facility]    Phone:  [phone]    Fax:  [phone]           Ang Montoya   MRN: [id number]    Department:  THE [facility] Emergency Department in [place]   Date of Visit: Commonly known as:  ZITHROMAX Z-ANGELA   500 mg once followed by 250 mg daily x 4 days       predniSONE 20 MG Tabs   Quantity:  10 tablet   Commonly known as:  DELTASONE   Take 2 tablets (40 mg total) by mouth daily.             Where to Get Your Medications return to your personal doctor) about any new or lasting problems. The primary care or specialist physician will see patients referred from the [facility] Emergency Department. Follow-up care is at the discretion of that Physician.     IF THERE IS ANY - If you have concerns related to behavioral health issues or thoughts of harming yourself, contact [address] [place] [address] at [phone].     - If you don’t have insurance, [facility] has partnered with [employer]. If you've been to the Emergency Department or your doctor's office, you can view your past visit information in [employer] by going to Visits < Visit Summaries. [employer] questions? Call [phone] for help.   [employer] is NOT to be used for urge

## (undated) NOTE — LETTER
[place], [address]  [place] [address]  [facility]: [phone]  FAX: [phone]        03/23/22    Gomez Weinberg  [address] [id number] [place] [address]       My office staff  have made several attempts to contact you at my request.  It is in my best judgment for your health and well-being that you contact my office for the following reason(s):    ___ Schedule office visit for a Follow Up     _x__ Schedule Annual Physical        ___      Other:        I believe that the relationship between a physician and their patient is one of communication and mutual cooperation. It is important for the patient to follow through with the recommendations made by their Doctor in order to achieve the best possible outcome. If there are any problems or concerns that I am not aware of , please call the office so that we may resolve any issues and proceed with your care.       Sincerely,    The office of Monica Canales MD